# Patient Record
Sex: MALE | Race: BLACK OR AFRICAN AMERICAN | Employment: OTHER | URBAN - METROPOLITAN AREA
[De-identification: names, ages, dates, MRNs, and addresses within clinical notes are randomized per-mention and may not be internally consistent; named-entity substitution may affect disease eponyms.]

---

## 2017-06-15 RX ORDER — POTASSIUM CHLORIDE 750 MG/1
TABLET, EXTENDED RELEASE ORAL
Refills: 1 | OUTPATIENT
Start: 2017-06-15

## 2017-06-15 NOTE — TELEPHONE ENCOUNTER
Called Cande Curiel pts dgt. Told her per Dr. Refugio Melgar that he has not been seen for more than 9 months and he needs an appointment for labs and f/u. She said he just had his labs drawn at the [facility] [place] and she will fax us a copy. Liseth said he has an appt. On 7/15/17 to see Dr. Refugio Melgar, I ask can she come in sooner so she can get refill, answer no my son just had surgery and Im going to be taking care of him. I ask if [facility] will fill the potassium based on they just seen him, she said I will ask them.

## 2017-06-15 NOTE — TELEPHONE ENCOUNTER
Major Wills, dtr requesting refill for:     KLOR-CON M10 10 mEq tablet     Best number to reach her is [phone]

## 2017-06-15 NOTE — TELEPHONE ENCOUNTER
juli Cardenas refuse. She's 89yoa, i've not seen for >9months. Please call her for appointment and lab monitoring. Please make sure you give her 30 mins appointment.      Tanesha Rogers MD  6/15/2017

## 2018-06-06 ENCOUNTER — APPOINTMENT (OUTPATIENT)
Dept: CT IMAGING | Age: 83
DRG: 871 | End: 2018-06-06
Attending: EMERGENCY MEDICINE
Payer: MEDICARE

## 2018-06-06 ENCOUNTER — HOSPITAL ENCOUNTER (INPATIENT)
Age: 83
LOS: 4 days | Discharge: HOME HEALTH | DRG: 871 | End: 2018-06-11
Attending: EMERGENCY MEDICINE | Admitting: INTERNAL MEDICINE
Payer: MEDICARE

## 2018-06-06 DIAGNOSIS — I95.9 HYPOTENSION, UNSPECIFIED HYPOTENSION TYPE: ICD-10-CM

## 2018-06-06 DIAGNOSIS — E86.0 DEHYDRATION: Primary | ICD-10-CM

## 2018-06-06 LAB
ANION GAP SERPL CALC-SCNC: 10 MMOL/L (ref 5–15)
APPEARANCE UR: CLEAR
BACTERIA URNS QL MICRO: NEGATIVE /HPF
BASOPHILS # BLD: 0 K/UL (ref 0–0.1)
BASOPHILS NFR BLD: 0 % (ref 0–1)
BILIRUB UR QL: NEGATIVE
BUN SERPL-MCNC: 23 MG/DL (ref 6–20)
BUN/CREAT SERPL: 15 (ref 12–20)
CALCIUM SERPL-MCNC: 9.4 MG/DL (ref 8.5–10.1)
CHLORIDE SERPL-SCNC: 102 MMOL/L (ref 97–108)
CK SERPL-CCNC: 55 U/L (ref 39–308)
CO2 SERPL-SCNC: 26 MMOL/L (ref 21–32)
COLOR UR: ABNORMAL
CREAT SERPL-MCNC: 1.54 MG/DL (ref 0.7–1.3)
DIFFERENTIAL METHOD BLD: ABNORMAL
EOSINOPHIL # BLD: 0.2 K/UL (ref 0–0.4)
EOSINOPHIL NFR BLD: 2 % (ref 0–7)
EPITH CASTS URNS QL MICRO: ABNORMAL /LPF
ERYTHROCYTE [DISTWIDTH] IN BLOOD BY AUTOMATED COUNT: 12.6 % (ref 11.5–14.5)
GLUCOSE BLD STRIP.AUTO-MCNC: 142 MG/DL (ref 65–100)
GLUCOSE SERPL-MCNC: 141 MG/DL (ref 65–100)
GLUCOSE UR STRIP.AUTO-MCNC: NEGATIVE MG/DL
HCT VFR BLD AUTO: 36 % (ref 36.6–50.3)
HGB BLD-MCNC: 12.5 G/DL (ref 12.1–17)
HGB UR QL STRIP: NEGATIVE
HYALINE CASTS URNS QL MICRO: ABNORMAL /LPF (ref 0–5)
IMM GRANULOCYTES # BLD: 0 K/UL (ref 0–0.04)
IMM GRANULOCYTES NFR BLD AUTO: 0 % (ref 0–0.5)
KETONES UR QL STRIP.AUTO: NEGATIVE MG/DL
LACTATE SERPL-SCNC: 1.2 MMOL/L (ref 0.4–2)
LACTATE SERPL-SCNC: 2.1 MMOL/L (ref 0.4–2)
LEUKOCYTE ESTERASE UR QL STRIP.AUTO: NEGATIVE
LYMPHOCYTES # BLD: 1.4 K/UL (ref 0.8–3.5)
LYMPHOCYTES NFR BLD: 14 % (ref 12–49)
MCH RBC QN AUTO: 30.8 PG (ref 26–34)
MCHC RBC AUTO-ENTMCNC: 34.7 G/DL (ref 30–36.5)
MCV RBC AUTO: 88.7 FL (ref 80–99)
MONOCYTES # BLD: 0.5 K/UL (ref 0–1)
MONOCYTES NFR BLD: 5 % (ref 5–13)
NEUTS SEG # BLD: 8.1 K/UL (ref 1.8–8)
NEUTS SEG NFR BLD: 79 % (ref 32–75)
NITRITE UR QL STRIP.AUTO: NEGATIVE
NRBC # BLD: 0 K/UL (ref 0–0.01)
NRBC BLD-RTO: 0 PER 100 WBC
PH UR STRIP: 5 [PH] (ref 5–8)
PLATELET # BLD AUTO: 192 K/UL (ref 150–400)
PMV BLD AUTO: 10.5 FL (ref 8.9–12.9)
POTASSIUM SERPL-SCNC: 3.2 MMOL/L (ref 3.5–5.1)
PROT UR STRIP-MCNC: NEGATIVE MG/DL
RBC # BLD AUTO: 4.06 M/UL (ref 4.1–5.7)
RBC #/AREA URNS HPF: ABNORMAL /HPF (ref 0–5)
SERVICE CMNT-IMP: ABNORMAL
SODIUM SERPL-SCNC: 138 MMOL/L (ref 136–145)
SP GR UR REFRACTOMETRY: >1.03 (ref 1–1.03)
TROPONIN I SERPL-MCNC: <0.04 NG/ML
UA: UC IF INDICATED,UAUC: ABNORMAL
UROBILINOGEN UR QL STRIP.AUTO: 0.2 EU/DL (ref 0.2–1)
WBC # BLD AUTO: 10.3 K/UL (ref 4.1–11.1)
WBC URNS QL MICRO: ABNORMAL /HPF (ref 0–4)

## 2018-06-06 PROCEDURE — 81001 URINALYSIS AUTO W/SCOPE: CPT | Performed by: EMERGENCY MEDICINE

## 2018-06-06 PROCEDURE — 80048 BASIC METABOLIC PNL TOTAL CA: CPT | Performed by: EMERGENCY MEDICINE

## 2018-06-06 PROCEDURE — 93005 ELECTROCARDIOGRAM TRACING: CPT

## 2018-06-06 PROCEDURE — 71275 CT ANGIOGRAPHY CHEST: CPT

## 2018-06-06 PROCEDURE — 36415 COLL VENOUS BLD VENIPUNCTURE: CPT | Performed by: EMERGENCY MEDICINE

## 2018-06-06 PROCEDURE — 82962 GLUCOSE BLOOD TEST: CPT

## 2018-06-06 PROCEDURE — C1751 CATH, INF, PER/CENT/MIDLINE: HCPCS

## 2018-06-06 PROCEDURE — 70450 CT HEAD/BRAIN W/O DYE: CPT

## 2018-06-06 PROCEDURE — 87040 BLOOD CULTURE FOR BACTERIA: CPT | Performed by: EMERGENCY MEDICINE

## 2018-06-06 PROCEDURE — 82550 ASSAY OF CK (CPK): CPT | Performed by: EMERGENCY MEDICINE

## 2018-06-06 PROCEDURE — 74177 CT ABD & PELVIS W/CONTRAST: CPT

## 2018-06-06 PROCEDURE — 96361 HYDRATE IV INFUSION ADD-ON: CPT

## 2018-06-06 PROCEDURE — 84484 ASSAY OF TROPONIN QUANT: CPT | Performed by: EMERGENCY MEDICINE

## 2018-06-06 PROCEDURE — 83605 ASSAY OF LACTIC ACID: CPT | Performed by: EMERGENCY MEDICINE

## 2018-06-06 PROCEDURE — 77030011943

## 2018-06-06 PROCEDURE — 99285 EMERGENCY DEPT VISIT HI MDM: CPT

## 2018-06-06 PROCEDURE — 85025 COMPLETE CBC W/AUTO DIFF WBC: CPT | Performed by: EMERGENCY MEDICINE

## 2018-06-06 PROCEDURE — 74011636320 HC RX REV CODE- 636/320: Performed by: EMERGENCY MEDICINE

## 2018-06-06 PROCEDURE — 74011250636 HC RX REV CODE- 250/636: Performed by: EMERGENCY MEDICINE

## 2018-06-06 RX ORDER — SODIUM CHLORIDE 9 MG/ML
1000 INJECTION, SOLUTION INTRAVENOUS ONCE
Status: COMPLETED | OUTPATIENT
Start: 2018-06-06 | End: 2018-06-06

## 2018-06-06 RX ORDER — SODIUM CHLORIDE 0.9 % (FLUSH) 0.9 %
10 SYRINGE (ML) INJECTION
Status: COMPLETED | OUTPATIENT
Start: 2018-06-06 | End: 2018-06-06

## 2018-06-06 RX ORDER — SODIUM CHLORIDE 9 MG/ML
1000 INJECTION, SOLUTION INTRAVENOUS ONCE
Status: COMPLETED | OUTPATIENT
Start: 2018-06-06 | End: 2018-06-07

## 2018-06-06 RX ORDER — NOREPINEPHRINE BITARTRATE/D5W 8 MG/250ML
2-16 PLASTIC BAG, INJECTION (ML) INTRAVENOUS
Status: DISCONTINUED | OUTPATIENT
Start: 2018-06-06 | End: 2018-06-08

## 2018-06-06 RX ORDER — SODIUM CHLORIDE 9 MG/ML
50 INJECTION, SOLUTION INTRAVENOUS
Status: COMPLETED | OUTPATIENT
Start: 2018-06-06 | End: 2018-06-07

## 2018-06-06 RX ADMIN — SODIUM CHLORIDE 50 ML/HR: 900 INJECTION, SOLUTION INTRAVENOUS at 20:18

## 2018-06-06 RX ADMIN — SODIUM CHLORIDE 1000 ML: 900 INJECTION, SOLUTION INTRAVENOUS at 18:35

## 2018-06-06 RX ADMIN — IOPAMIDOL 100 ML: 755 INJECTION, SOLUTION INTRAVENOUS at 20:18

## 2018-06-06 RX ADMIN — SODIUM CHLORIDE 1000 ML: 900 INJECTION, SOLUTION INTRAVENOUS at 20:44

## 2018-06-06 RX ADMIN — Medication 10 ML: at 20:18

## 2018-06-06 RX ADMIN — SODIUM CHLORIDE 1000 ML: 900 INJECTION, SOLUTION INTRAVENOUS at 18:44

## 2018-06-06 NOTE — ED NOTES
Bedside and Verbal shift change report given to Nayeli Peters RN (oncoming nurse) by Emmanuel Crane RN (offgoing nurse). Report included the following information SBAR, Kardex, ED Summary, MAR, Accordion and Recent Results.

## 2018-06-06 NOTE — ED PROVIDER NOTES
EMERGENCY DEPARTMENT HISTORY AND PHYSICAL EXAM      Date: 6/6/2018  Patient Name: Glen Miller    History of Presenting Illness     Chief Complaint   Patient presents with    Lethargy    Hypotension       History Provided By: Patient's Daughter    HPI: Glen Miller, 80 y.o. male with PMHx significant for HTN, CAD, CHF, and dementia, presents via EMS to the ED for evaluation after daughter came home and found pt slumped over to R side and was less responsive than baseline. Daughter also reports constipation. She notes pt lives at home with her, is minimally verbal and minimally ambulatory with a walker at baseline. She states pt is a patient at the [facility] and gets injections every 6 months for prostate CA. She denies any recent NVD or fever. History of present illness is limited due AMS. PCP: Mauri Hernandez MD    Current Facility-Administered Medications   Medication Dose Route Frequency Provider Last Rate Last Dose    0.9% sodium chloride infusion 1,000 mL  1,000 mL IntraVENous ONCE Nina Hansen, DO         Current Outpatient Prescriptions   Medication Sig Dispense Refill    KLOR-CON M10 10 mEq tablet TAKE 1 TABLET BY MOUTH EVERY DAY  1    RAPAFLO 4 mg capsule   11    miconazole (MICOTIN) 2 % topical cream Apply  to affected area two (2) times a day. 42.5 g 1    cholecalciferol, vitamin D3, (VITAMIN D3) 2,000 unit tab Take 400 Units by mouth.  hydrochlorothiazide (HYDRODIURIL) 25 mg tablet Take 1 Tab by mouth daily. Indications: HYPERTENSION 60 Tab 1    aspirin 81 mg chewable tablet Take 81 mg by mouth daily.          Past History     Past Medical History:  Past Medical History:   Diagnosis Date    CAD (coronary artery disease)     Chronic systolic congestive heart failure ([address] 8/23/2016    Dementia     Elevated PSA 2/1/2016    H/O prostate cancer 2/1/2016    Hypertension     MI (myocardial infarction) ([address]     Pacemaker 8/23/2016    Pneumonia        Past Surgical History:  Past Surgical History:   Procedure Laterality Date    HX CORONARY STENT PLACEMENT         Family History:  History reviewed. No pertinent family history. Social History:  Social History   Substance Use Topics    Smoking status: Never Smoker    Smokeless tobacco: Never Used    Alcohol use No       Allergies:  No Known Allergies      Review of Systems   Review of Systems   Unable to perform ROS: Mental status change     Physical Exam   Physical Exam   Constitutional: He appears well-developed and well-nourished. HENT:   Head: Normocephalic and atraumatic. Dry mucous membranes   Eyes: Conjunctivae are normal. Pupils are equal, round, and reactive to light. Right eye exhibits no discharge. Left eye exhibits no discharge. Neck: Normal range of motion. Neck supple. No tracheal deviation present. Cardiovascular: Normal rate, regular rhythm and normal heart sounds. No murmur heard. Pulmonary/Chest: Effort normal and breath sounds normal. No respiratory distress. He has no wheezes. He has no rales. Abdominal: Soft. Bowel sounds are normal. There is no tenderness. There is no rebound and no guarding. Musculoskeletal: Normal range of motion. He exhibits no edema, tenderness or deformity. Neurological:   Awake. Responding to family. Moving all extremities, leaning to the R side. Skin: Skin is warm and dry. No rash noted. No erythema. Psychiatric: His behavior is normal.   Nursing note and vitals reviewed.     Diagnostic Study Results     Labs -     Recent Results (from the past 12 hour(s))   EKG, 12 LEAD, INITIAL    Collection Time: 06/06/18  5:29 PM   Result Value Ref Range    Ventricular Rate 73 BPM    Atrial Rate 73 BPM    P-R Interval 166 ms    QRS Duration 166 ms    Q-T Interval 498 ms    QTC Calculation (Bezet) 548 ms    Calculated P Axis 28 degrees    Calculated R Axis -67 degrees    Calculated T Axis 103 degrees    Diagnosis       Atrial-sensed ventricular-paced rhythm  Abnormal ECG  When compared with ECG of 25-OCT-2011 11:00,  Electronic ventricular pacemaker has replaced Sinus rhythm     GLUCOSE, POC    Collection Time: 06/06/18  5:38 PM   Result Value Ref Range    Glucose (POC) 142 (H) 65 - 100 mg/dL    Performed by DESTINEE CHAUDHRY(ELANA)    CBC WITH AUTOMATED DIFF    Collection Time: 06/06/18  6:36 PM   Result Value Ref Range    WBC 10.3 4.1 - 11.1 K/uL    RBC 4.06 (L) 4.10 - 5.70 M/uL    HGB 12.5 12.1 - 17.0 g/dL    HCT 36.0 (L) 36.6 - 50.3 %    MCV 88.7 80.0 - 99.0 FL    MCH 30.8 26.0 - 34.0 PG    MCHC 34.7 30.0 - 36.5 g/dL    RDW 12.6 11.5 - 14.5 %    PLATELET 477 595 - 103 K/uL    MPV 10.5 8.9 - 12.9 FL    NRBC 0.0 0  WBC    ABSOLUTE NRBC 0.00 0.00 - 0.01 K/uL    NEUTROPHILS 79 (H) 32 - 75 %    LYMPHOCYTES 14 12 - 49 %    MONOCYTES 5 5 - 13 %    EOSINOPHILS 2 0 - 7 %    BASOPHILS 0 0 - 1 %    IMMATURE GRANULOCYTES 0 0.0 - 0.5 %    ABS. NEUTROPHILS 8.1 (H) 1.8 - 8.0 K/UL    ABS. LYMPHOCYTES 1.4 0.8 - 3.5 K/UL    ABS. MONOCYTES 0.5 0.0 - 1.0 K/UL    ABS. EOSINOPHILS 0.2 0.0 - 0.4 K/UL    ABS. BASOPHILS 0.0 0.0 - 0.1 K/UL    ABS. IMM.  GRANS. 0.0 0.00 - 0.04 K/UL    DF AUTOMATED     LACTIC ACID    Collection Time: 06/06/18  6:36 PM   Result Value Ref Range    Lactic acid 2.1 (HH) 0.4 - 2.0 MMOL/L   CK W/ REFLX CKMB    Collection Time: 06/06/18  6:36 PM   Result Value Ref Range    CK 55 39 - 308 U/L   TROPONIN I    Collection Time: 06/06/18  6:36 PM   Result Value Ref Range    Troponin-I, Qt. <0.04 <1.85 ng/mL   METABOLIC PANEL, BASIC    Collection Time: 06/06/18  6:36 PM   Result Value Ref Range    Sodium 138 136 - 145 mmol/L    Potassium 3.2 (L) 3.5 - 5.1 mmol/L    Chloride 102 97 - 108 mmol/L    CO2 26 21 - 32 mmol/L    Anion gap 10 5 - 15 mmol/L    Glucose 141 (H) 65 - 100 mg/dL    BUN 23 (H) 6 - 20 MG/DL    Creatinine 1.54 (H) 0.70 - 1.30 MG/DL    BUN/Creatinine ratio 15 12 - 20      GFR est AA 51 (L) >60 ml/min/1.73m2    GFR est non-AA 42 (L) >60 ml/min/1.73m2    Calcium 9.4 8.5 - 10.1 MG/DL   LACTIC ACID    Collection Time: 06/06/18  7:35 PM   Result Value Ref Range    Lactic acid 1.2 0.4 - 2.0 MMOL/L   URINALYSIS W/ REFLEX CULTURE    Collection Time: 06/06/18 10:00 PM   Result Value Ref Range    Color YELLOW/STRAW      Appearance CLEAR CLEAR      Specific gravity >1.030 (H) 1.003 - 1.030    pH (UA) 5.0 5.0 - 8.0      Protein NEGATIVE  NEG mg/dL    Glucose NEGATIVE  NEG mg/dL    Ketone NEGATIVE  NEG mg/dL    Bilirubin NEGATIVE  NEG      Blood NEGATIVE  NEG      Urobilinogen 0.2 0.2 - 1.0 EU/dL    Nitrites NEGATIVE  NEG      Leukocyte Esterase NEGATIVE  NEG      UA:UC IF INDICATED CULTURE NOT INDICATED BY UA RESULT CNI      WBC 0-4 0 - 4 /hpf    RBC 0-5 0 - 5 /hpf    Epithelial cells FEW FEW /lpf    Bacteria NEGATIVE  NEG /hpf    Hyaline cast 0-2 0 - 5 /lpf       Radiologic Studies -   CT ABD PELV W CONT   Final Result      CTA CHEST W OR W WO CONT   Final Result      CT HEAD WO CONT   Final Result        CT Results  (Last 48 hours)               06/06/18 2018  CTA CHEST W OR W WO CONT Final result    Impression:  IMPRESSION:    1. No Pulmonary Embolus. 2. No Acute Findings. Narrative:  INDICATION: Chest pain, acute, pulmonary embolism (PE) suspected        EXAM: CT Angio Chest:       TECHNIQUE: Unenhanced localizing CT imaging of the pulmonary arteries is   followed by bolus injection of 100 mL Isovue 370 contrast IV, with thin section   axial Chest CT obtained and 3D image post processing performed including coronal   MIPS. CT dose reduction was achieved through use of a standardized protocol   tailored for this examination and automatic exposure control for dose   modulation. FINDINGS:    There is no pulmonary embolism. There is no apparent aortic dissection or   aneurysm. Lungs show no acute finding; there is right lower lobe scarring with air   bronchograms  unchanged since 7/12/2015. There is no pneumothorax. There is no pleural or significant pericardial   effusion.  There is no significant adenopathy. Visualized thyroid and lower neck soft tissues are unremarkable for age. 06/06/18 2018  CT ABD PELV W CONT Final result    Impression:  IMPRESSION: No Acute Disease. Narrative:  INDICATION: Abdominal pain        EXAM: CT Abdomen and CT Pelvis is performed with 100 mL Isovue 370 contrast IV   without complication. CT dose reduction was achieved through use of a   standardized protocol tailored for this examination and automatic exposure   control for dose modulation. FINDINGS:    There are colonic diverticula. There is no inflammation, ascites,   pneumoperitoneum or significant adenopathy. Liver and gallbladder are unremarkable. Bile ducts are not enlarged. Pancreas   shows no mass or inflammation. Spleen is unremarkable. Adrenal glands are normal   in size. Kidneys show no mass or hydronephrosis. Aorta is without aneurysm. The appendix is normal. The bladder is not distended. The distal ureters are not   dilated. There is no apparent pelvic mass. 06/06/18 2018  CT HEAD WO CONT Final result    Impression:  IMPRESSION: No acute intracranial abnormality. Atherosclerosis with   microvascular disease and age-related volume loss. Narrative:  EXAM:  CT HEAD WITHOUT CONTRAST   INDICATION: Confusion/delirium, altered LOC, unexplained. Leaning to left,   possible stroke, worsening mental status. COMPARISON: None. CONTRAST: None. TECHNIQUE: Unenhanced CT of the head was performed using 5 mm images. Brain and   bone windows were generated. Sagittal and coronal reformations were generated. CT dose reduction was achieved through use of a standardized protocol tailored   for this examination and automatic exposure control for dose modulation. FINDINGS:   The ventricles and sulci are enlarged in a generalized fashion consistent with   volume loss.   There are atherosclerotic calcifications with patchy decreased attenuation in the periventricular white matter which is nonspecific but   consistent with small vessel disease. There is no intracranial hemorrhage. There is no extra-axial collection, mass, mass effect or midline shift. The   basilar cisterns are open. No acute infarct is identified. The bone windows   demonstrate no abnormalities. Mucus retention cyst in the left maxillary sinus. The lenses of the eyes are very dense. CXR Results  (Last 48 hours)    None            Medical Decision Making   I am the first provider for this patient. I reviewed the vital signs, available nursing notes, past medical history, past surgical history, family history and social history. Vital Signs-Reviewed the patient's vital signs. Patient Vitals for the past 12 hrs:   Temp Pulse Resp BP SpO2   06/06/18 2245 - 74 17 (!) 71/44 100 %   06/06/18 2241 - 70 20 (!) 89/40 99 %   06/06/18 2234 - 71 18 (!) 74/42 98 %   06/06/18 2230 - 72 17 (!) 73/41 99 %   06/06/18 2229 - 70 17 (!) 71/39 99 %   06/06/18 2201 - 78 21 - 99 %   06/06/18 2200 - 91 20 108/48 -   06/06/18 2133 - 73 18 91/53 98 %   06/06/18 2115 - 75 18 98/45 97 %   06/06/18 2100 - 76 21 98/47 97 %   06/06/18 2033 - 74 20 91/43 97 %   06/06/18 2028 - 76 20 - 98 %   06/06/18 2026 - - - 100/46 -   06/06/18 1945 - 73 20 94/45 97 %   06/06/18 1930 - 71 19 98/49 97 %   06/06/18 1915 - 73 20 100/44 -   06/06/18 1837 - 67 13 (!) 88/49 (!) 59 %   06/06/18 1800 - 74 20 (!) 68/33 100 %   06/06/18 1745 - 77 23 (!) 72/40 100 %   06/06/18 1730 97.8 °F (36.6 °C) 77 15 (!) 73/50 99 %       Pulse Oximetry Analysis - 98% on RA    Cardiac Monitor:   Rate: 73 bpm  Rhythm: atrial-sensed ventricular paced rhythm    EKG interpretation: (Preliminary) 6009  Rhythm: atrial-sensed ventricular paced rhythm; and regular . Rate (approx.): 73;  Axis: normal; P wave: normal; QRS interval: normal ; ST/T wave: no ST elevation, no ST depression; no ischemic changes   ms,  ms, QTc 548 ms. Written by Lexi Amin ED Scribe, as dictated by Yue Rodriguez DO    Records Reviewed: Nursing Notes, Old Medical Records, Previous electrocardiograms, Previous Radiology Studies and Previous Laboratory Studies    Provider Notes (Medical Decision Making):   79 yo M presents with hypotension of uncertain etiology. No signs of acute blood loss. Doubt cardiac shock or obstructive shock. Vasodilatory shock and dehydration possible . Will provide IVF. Will scan chest, ABD/pelvis for signs of intraABD bleed and severe infection. Will obtain CT head given pt leaning to R and worsening mental status. ED Course:   Initial assessment performed. The patients presenting problems have been discussed, and they are in agreement with the care plan formulated and outlined with them. I have encouraged them to ask questions as they arise throughout their visit. 10:18 PM  Notified that they are having a difficult time straight cath-ing pt. Written by Lexi Amin, ED Scribe, as dictated by Yue Rodriguez DO.    10:36 PM  Pt BP has improved. Labs and imaging reassuring. Labs consistent with volume depletion/dehydration. Still awaiting UA. Written by Lexi Amin ED Scribe, as dictated by Yue Rodriguez DO.    CONSULT NOTE:   12:15 AM  Carmen Ruby with [facility] attending   Specialty: Emergency Medicine   Discussed pt's hx, disposition, and available diagnostic and imaging results. Reviewed care plans. Does not feel transferring patient is appropriate. Progress:  12:21 AM  Patient's family requesting full code. SIGN OUT:  11:16 PM  Patient's presentation, labs/imaging and plan of care was reviewed with Khushi Booth MD as part of sign out. They will continue care as part of the plan discussed with the patient. Khushi Booth MD's assistance in completion of this plan is greatly appreciated but it should be noted that I will be the provider of record for this patient.     Critical Care Time:   0    Disposition:  12:21 AM  Patient is being admitted to the hospital by Dr. Chelsy Chacon. The results of their tests and reasons for their admission have been discussed with them and/or available family. They convey agreement and understanding for the need to be admitted and for their admission diagnosis. Consultation has been made with the inpatient physician specialist for hospitalization. PLAN:  1. Current Discharge Medication List        2. Follow-up Information     Follow up With Details Comments Contact Info    Nicole Leslie MD In 1 day  [facility] [address] [place] [address]  [phone]      [facility] EMERGENCY DEPT  If symptoms worsen [address] [facility] [address]  [phone]        Return to ED if worse     Diagnosis     Clinical Impression:   1. Dehydration        Attestations: This note is prepared by Cheyenne Chawla, acting as Scribe for Calista Denton DO. Calista Denton DO: The scribe's documentation has been prepared under my direction and personally reviewed by me in its entirety. I confirm that the note above accurately reflects all work, treatment, procedures, and medical decision making performed by me.

## 2018-06-06 NOTE — ED TRIAGE NOTES
Assumed care of this patient. He is alert, oriented to person only. Patient has hx of dementia. Patient's daughter had the home health nurse check patient's BP at home because patient was more lethargic than normal at home. He was recently changed from HCTZ to lisinopril 10mg this week. Patient does have a slight lean to the right.

## 2018-06-06 NOTE — ED NOTES
Care assumed of patient @ this time & intro with rounding done, with report from __Jud SMALL RN_________________. Patient resting quietly on stretcher without verbal complaints.

## 2018-06-06 NOTE — IP AVS SNAPSHOT
[address] [facility] 
[phone] Patient: Bernard Rosado MRN: [id number] :[id number] A check viktoria indicates which time of day the medication should be taken. My Medications START taking these medications Instructions Each Dose to Equal  
 Morning Noon Evening Bedtime  
 amLODIPine 5 mg tablet Commonly known as:  Roberta Cordial Your last dose was: Your next dose is: Take 1 Tab by mouth daily. 5 mg CHANGE how you take these medications Instructions Each Dose to Equal  
 Morning Noon Evening Bedtime  
 miconazole 2 % topical cream  
Commonly known as:  Deanne Phoenix What changed:   
- when to take this 
- reasons to take this Your last dose was: Your next dose is:    
   
   
 Apply  to affected area two (2) times a day. CONTINUE taking these medications Instructions Each Dose to Equal  
 Morning Noon Evening Bedtime  
 aspirin 81 mg chewable tablet Your last dose was: Your next dose is: Take 81 mg by mouth daily. 81 mg  
    
   
   
   
  
 RAPAFLO 4 mg capsule Generic drug:  silodosin Your last dose was: Your next dose is: Take 4 mg by mouth daily (with breakfast). 4 mg VITAMIN D3 2,000 unit Tab Generic drug:  cholecalciferol (vitamin D3) Your last dose was: Your next dose is: Take 400 Units by mouth daily. 400 Units STOP taking these medications   
 lisinopril 10 mg tablet Commonly known as:  Viki Harp Where to Get Your Medications Information on where to get these meds will be given to you by the nurse or doctor. ! Ask your nurse or doctor about these medications  
  amLODIPine 5 mg tablet

## 2018-06-06 NOTE — IP AVS SNAPSHOT
[address] [facility] 
[phone] Patient: Na Carr MRN: [id number] :[id number] About your hospitalization You were admitted on:  2018 You last received care in the:  [facility] 2 GENERAL SURGERY You were discharged on:  2018 Why you were hospitalized Your primary diagnosis was:  Not on File Your diagnoses also included:  Shock (Hcc), Dementia In Alzheimer's Disease, Htn (Hypertension), H/O Prostate Cancer, Acute On Chronic Renal Insufficiency, Hypokalemia Follow-up Information Follow up With Details Comments Contact Info Ann-Marie Dupont MD Go on 2018 For hospital follow up appointment at 12:00PM  [address] [facility] [address] [facility] 
[phone] Valeri العلي MD In 1 week for free voiding trial  [address] [facility] [address] [facility] 
[phone] Ann-Marie Dupont MD   [address] [facility] [address] [facility] 
[phone] [facility] HOME CARE [place] On 2018 THIS IS YOUR HOME HEALTH AGENCY. IF YOU DO NOT HEAR FROM THEM WITHIN 24-48HRS,PLEASE CONTACT THEM DIRECTLY [address] [place] [address] 
1st Floor [place] [address] 
[phone] Your Scheduled Appointments 2018 12:00 PM EDT Office Visit with Ann-Marie Dupont MD  
[facility] [address] [facility] [address] [facility]  
[phone] Discharge Orders None A check viktoria indicates which time of day the medication should be taken. My Medications START taking these medications Instructions Each Dose to Equal  
 Morning Noon Evening Bedtime  
 amLODIPine 5 mg tablet Commonly known as:  Alexander Ruvalcaba Your last dose was: Your next dose is: Take 1 Tab by mouth daily. 5 mg CHANGE how you take these medications Instructions Each Dose to Equal  
 Morning Noon Evening Bedtime  
 miconazole 2 % topical cream  
Commonly known as:  Won Overall What changed:   
- when to take this 
- reasons to take this Your last dose was: Your next dose is:    
   
   
 Apply  to affected area two (2) times a day. CONTINUE taking these medications Instructions Each Dose to Equal  
 Morning Noon Evening Bedtime  
 aspirin 81 mg chewable tablet Your last dose was: Your next dose is: Take 81 mg by mouth daily. 81 mg  
    
   
   
   
  
 RAPAFLO 4 mg capsule Generic drug:  silodosin Your last dose was: Your next dose is: Take 4 mg by mouth daily (with breakfast). 4 mg VITAMIN D3 2,000 unit Tab Generic drug:  cholecalciferol (vitamin D3) Your last dose was: Your next dose is: Take 400 Units by mouth daily. 400 Units STOP taking these medications   
 lisinopril 10 mg tablet Commonly known as:  Vi Guardado Where to Get Your Medications Information on where to get these meds will be given to you by the nurse or doctor. ! Ask your nurse or doctor about these medications  
  amLODIPine 5 mg tablet Discharge Instructions Patient Discharge Instructions Shon Bocanegra / [id number] : 1926 Admitted 2018 Discharged: 2018 DISCHARGE DIAGNOSIS:  
 
Low blood pressure was likely due to dehydration  
- drink plenty of fluids Urinary retention  
-Follow with Dr Tio Gardiner in 1 week for free voiding trial  
 
HTN (hypertension)  
-you were started on new blood pressure medication Norvasc Take Home Medications You were treated with antibiotic while in the hopsital  
 One of the most important side effect to watch while taking antibiotic or after you just finished taking it is diarrhea. This type of diarrhea may be caused by an infection with bacteria called C. difficile. C. difficile normally lives in the intestines. When people are on antibiotics, the C. difficile in their intestines can overgrow and cause infection. If you develop any side effect especially diarrhea while taking antibiotics it is very important to contact your doctor. General drug facts If you have a very bad allergy, wear an allergy ID at all times. It is important that you take the medication exactly as they are prescribed. Keep your medication in the bottles provided by the pharmacist. 
Keep a list of all your drugs (prescription, natural products, vitamins, OTC) with you. Give this list to your doctor. Do not take other medications without consulting your doctor. Do not share your drugs with others and do not take anyone else's drugs. Keep all drugs out of the reach of children and pets. Most drugs may be thrown away in household trash after mixing with coffee grounds or sis litter and sealing in a plastic bag. Keep a list Call your doctor for help with any side effects. If in the [place] you may also call the FDA at 9-270-FDA-2771 Talk with the doctor before starting any new drug, including OTC, natural products, or vitamins. What to do at [facility] 1. Recommended diet: dysphasia 3 - mechanically soft 2. Recommended activity: Activity as tolerated and PT/OT Eval and Treat 3. If you experience any of the following symptoms then please call your primary care physician or return to the emergency room if you cannot get hold of your doctor:fever/chills/dizziness/weakness 4. Lab work: with PCP 5. Bring these papers with you to your follow up appointments.  The papers will help your doctors be sure to continue the care plan from the hospital. 
 
 
 Follow-up with:  
PCP: Shawna Cano MD 
Follow-up Information Follow up With Details Comments Contact Info Shawna Cano MD In 1 week  [address] [facility] [address] [facility] 
[phone] Sudha Mckenna MD In 1 week for free voiding trial  [address] [facility] [address] [facility] 
[phone] Please call for your own appointment Information obtained by : 
I understand that if any problems occur once I am at home I am to contact my physician. I understand and acknowledge receipt of the instructions indicated above. Physician's or R.N.'s Signature                                                                  Date/Time Patient or Representative Signature                                                          Date/Time 
 
 
 
 
[facility] Transitions of Care Introducing [facility] offers a voluntary care coordination program to provide high quality service and care to [facility] fee-for-service beneficiaries. Spencer Palm was designed to help you enhance your health and well-being through the following services: ? Transitions of Care  support for individuals who are transitioning from one care setting to another (example: Hospital to home). ? Chronic and Complex Care Coordination  support for individuals and caregivers of those with serious or chronic illnesses or with more than one chronic (ongoing) condition and those who take a number of different medications.   
 
 
If you meet specific medical criteria, a [address] Coordinator may call you directly to coordinate your care with your primary care physician and your other care providers. For questions about the [facility] programs, please, contact your physicians office. For general questions or additional information about Accountable Care Organizations: 
Please visit www.medicare.gov/acos. html or call 1-800-MEDICARE (0-857.398.5843) TTY users should call 1-646.551.7806. [employer] Announcement We are excited to announce that we are making your provider's discharge notes available to you in [employer]. You will see these notes when they are completed and signed by the physician that discharged you from your recent hospital stay. If you have any questions or concerns about any information you see in [employer], please call the Health Information Department where you were seen or reach out to your Primary Care Provider for more information about your plan of care. Introducing [facility]! Alexa Aquino introduces [employer] patient portal. Now you can access parts of your medical record, email your doctor's office, and request medication refills online. 1. In your internet browser, go to [email or web address] 2. Click on the First Time User? Click Here link in the Sign In box. You will see the New Member Sign Up page. 3. Enter your [employer] Access Code exactly as it appears below. You will not need to use this code after youve completed the sign-up process. If you do not sign up before the expiration date, you must request a new code. · [employer] Access Code: [email or web address] Expires: 9/4/2018  5:29 PM 
 
4. Enter the last four digits of your Social Security Number (xxxx) and Date of Birth (mm/dd/yyyy) as indicated and click Submit. You will be taken to the next sign-up page. 5. Create a [employer] ID. This will be your [employer] login ID and cannot be changed, so think of one that is secure and easy to remember. 6. Create a [employer] password. You can change your password at any time. 7. Enter your Password Reset Question and Answer. This can be used at a later time if you forget your password. 8. Enter your e-mail address. You will receive e-mail notification when new information is available in [address] 9. Click Sign Up. You can now view and download portions of your medical record. 10. Click the Download Summary menu link to download a portable copy of your medical information. If you have questions, please visit the Frequently Asked Questions section of the [employer] website. Remember, [employer] is NOT to be used for urgent needs. For medical emergencies, dial 911. Now available from your iPhone and Android! Introducing Morgan Mclean As a [employer] patient, I wanted to make you aware of our electronic visit tool called Morgan Mclean. [employer] 24/7 allows you to connect within minutes with a medical provider 24 hours a day, seven days a week via a mobile device or tablet or logging into a secure website from your computer. You can access Morgan Mclean from anywhere in the [place]. A virtual visit might be right for you when you have a simple condition and feel like you just dont want to get out of bed, or cant get away from work for an appointment, when your regular [employer] provider is not available (evenings, weekends or holidays), or when youre out of town and need minor care. Electronic visits cost only $49 and if the [employer] 24/7 provider determines a prescription is needed to treat your condition, one can be electronically transmitted to a nearby pharmacy*. Please take a moment to enroll today if you have not already done so. The enrollment process is free and takes just a few minutes. To enroll, please download the [employer] 24/7 celestino to your tablet or phone, or visit [email or web address] to enroll on your computer. And, as an [address] [place] [address] patient with a [employer] account, the results of your visits will be scanned into your electronic medical record and your primary care provider will be able to view the scanned results. We urge you to continue to see your regular [facility] provider for your ongoing medical care. And while your primary care provider may not be the one available when you seek a Morgan Mclean virtual visit, the peace of mind you get from getting a real diagnosis real time can be priceless. For more information on Morgan Haskinsfin, view our Frequently Asked Questions (FAQs) at [email or web address]. Sincerely, 
 
Seble Clinton MD 
Chief Medical Officer Niranjan Ward *:  certain medications cannot be prescribed via Morgan [employer] Unresulted tests-please follow up with your PCP on these results Procedure/Test Authorizing Provider  CBC W/O DIFF Spencer Jean MD  
 CBC W/O DIFF Spencer Jean MD  
 CBC WITH AUTOMATED DIFF [employer], DO  
 CBC WITH AUTOMATED DIFF Marc Chadwick MD  
 CK W/ REFLX CKMB [employer], DO  
 CK W/ REFLX CKMB [employer], DO  
 CORTISOL, AM Marc Chadwick MD  
 CT ABD PELV W CONT [employer], DO  
 CT HEAD WO CONT [employer], DO  
 CTA CHEST W OR W WO CONT [employer], DO  
 CULTURE, BLOOD Spencer Jean MD  
 CULTURE, BLOOD, PAIRED [employer], DO  
 CULTURE, BLOOD, PAIRED [employer], DO  
 CULTURE, URINE Radhames Alex MD  
 EKG, 12 LEAD, INITIAL [employer], DO  
 HEPATIC FUNCTION PANEL Marc Chadwick MD  
 LACTIC ACID [employer], DO  
 LACTIC ACID [employer], DO  
 MAGNESIUM Spencer Jean MD  
 METABOLIC PANEL, BASIC [employer], DO  
 METABOLIC PANEL, BASIC Spencer Jean MD  
 METABOLIC PANEL, BASIC Spencer Jean MD  
 METABOLIC PANEL, COMPREHENSIVE Marc Chadwick MD  
 PHOSPHORUS Spencer Jean MD  
 TROPONIN I [employer], DO  
 TROPONIN I [employer], DO  
 TSH 3RD GENERATION Leigh Montelongo MD  
 URINALYSIS W/ REFLEX CULTURE Mariela Mathieu, DO  
 VANCOMYCIN, TROUGH Jennie Adams MD  
  
Providers Seen During Your Hospitalization Provider Specialty Primary office phone Mariela Murdock DO Emergency Medicine [phone] Jennie Adams MD Internal Medicine [phone] Your Primary Care Physician (PCP) Primary Care Physician Office Phone Office Fax Gladis hCua [phone] You are allergic to the following Allergen Reactions Lisinopril Anaphylaxis Recent Documentation Height Weight BMI Smoking Status 1.626 m 71.3 kg 26.98 kg/m2 Never Smoker Emergency Contacts Name Discharge Info Relation Home Work Mobile [facility] DISCHARGE CAREGIVER [3] Daughter [21] [phone] Manju Curiel  Child [2] [phone] Patient Belongings The following personal items are in your possession at time of discharge: 
     Visual Aid: Glasses, With patient Please provide this summary of care documentation to your next provider. Signatures-by signing, you are acknowledging that this After Visit Summary has been reviewed with you and you have received a copy. Patient Signature:  ____________________________________________________________ Date:  ____________________________________________________________  
  
Claudene Born Provider Signature:  ____________________________________________________________ Date:  ____________________________________________________________

## 2018-06-07 ENCOUNTER — APPOINTMENT (OUTPATIENT)
Dept: GENERAL RADIOLOGY | Age: 83
DRG: 871 | End: 2018-06-07
Attending: EMERGENCY MEDICINE
Payer: MEDICARE

## 2018-06-07 PROBLEM — N18.9 ACUTE ON CHRONIC RENAL INSUFFICIENCY: Status: ACTIVE | Noted: 2018-06-07

## 2018-06-07 PROBLEM — E87.6 HYPOKALEMIA: Status: ACTIVE | Noted: 2018-06-07

## 2018-06-07 PROBLEM — R57.9 SHOCK (HCC): Status: ACTIVE | Noted: 2018-06-07

## 2018-06-07 PROBLEM — N28.9 ACUTE ON CHRONIC RENAL INSUFFICIENCY: Status: ACTIVE | Noted: 2018-06-07

## 2018-06-07 LAB
ALBUMIN SERPL-MCNC: 2.8 G/DL (ref 3.5–5)
ALBUMIN/GLOB SERPL: 0.9 {RATIO} (ref 1.1–2.2)
ALP SERPL-CCNC: 43 U/L (ref 45–117)
ALT SERPL-CCNC: 12 U/L (ref 12–78)
AST SERPL-CCNC: 14 U/L (ref 15–37)
ATRIAL RATE: 73 BPM
BILIRUB DIRECT SERPL-MCNC: 0.1 MG/DL (ref 0–0.2)
BILIRUB SERPL-MCNC: 0.5 MG/DL (ref 0.2–1)
CALCULATED P AXIS, ECG09: 28 DEGREES
CALCULATED R AXIS, ECG10: -67 DEGREES
CALCULATED T AXIS, ECG11: 103 DEGREES
CK SERPL-CCNC: 53 U/L (ref 39–308)
CORTIS AM PEAK SERPL-MCNC: 12.2 UG/DL (ref 4.3–22.4)
DIAGNOSIS, 93000: NORMAL
GLOBULIN SER CALC-MCNC: 3.1 G/DL (ref 2–4)
P-R INTERVAL, ECG05: 166 MS
PROT SERPL-MCNC: 5.9 G/DL (ref 6.4–8.2)
Q-T INTERVAL, ECG07: 498 MS
QRS DURATION, ECG06: 166 MS
QTC CALCULATION (BEZET), ECG08: 548 MS
TROPONIN I SERPL-MCNC: <0.04 NG/ML
TSH SERPL DL<=0.05 MIU/L-ACNC: 2.97 UIU/ML (ref 0.36–3.74)
VENTRICULAR RATE, ECG03: 73 BPM

## 2018-06-07 PROCEDURE — 74011250637 HC RX REV CODE- 250/637: Performed by: INTERNAL MEDICINE

## 2018-06-07 PROCEDURE — 87086 URINE CULTURE/COLONY COUNT: CPT | Performed by: UROLOGY

## 2018-06-07 PROCEDURE — 84484 ASSAY OF TROPONIN QUANT: CPT | Performed by: EMERGENCY MEDICINE

## 2018-06-07 PROCEDURE — 74011250636 HC RX REV CODE- 250/636

## 2018-06-07 PROCEDURE — 51798 US URINE CAPACITY MEASURE: CPT

## 2018-06-07 PROCEDURE — 87040 BLOOD CULTURE FOR BACTERIA: CPT | Performed by: EMERGENCY MEDICINE

## 2018-06-07 PROCEDURE — 84443 ASSAY THYROID STIM HORMONE: CPT | Performed by: INTERNAL MEDICINE

## 2018-06-07 PROCEDURE — 96365 THER/PROPH/DIAG IV INF INIT: CPT

## 2018-06-07 PROCEDURE — 82533 TOTAL CORTISOL: CPT | Performed by: INTERNAL MEDICINE

## 2018-06-07 PROCEDURE — 77030010547 HC BG URIN W/UMETER -A

## 2018-06-07 PROCEDURE — 96366 THER/PROPH/DIAG IV INF ADDON: CPT

## 2018-06-07 PROCEDURE — 77030005518 HC CATH URETH FOL 2W BARD -B

## 2018-06-07 PROCEDURE — 74011000250 HC RX REV CODE- 250: Performed by: EMERGENCY MEDICINE

## 2018-06-07 PROCEDURE — 80076 HEPATIC FUNCTION PANEL: CPT | Performed by: INTERNAL MEDICINE

## 2018-06-07 PROCEDURE — 36415 COLL VENOUS BLD VENIPUNCTURE: CPT | Performed by: EMERGENCY MEDICINE

## 2018-06-07 PROCEDURE — 06HY33Z INSERTION OF INFUSION DEVICE INTO LOWER VEIN, PERCUTANEOUS APPROACH: ICD-10-PCS | Performed by: EMERGENCY MEDICINE

## 2018-06-07 PROCEDURE — 74011250636 HC RX REV CODE- 250/636: Performed by: INTERNAL MEDICINE

## 2018-06-07 PROCEDURE — 74011250636 HC RX REV CODE- 250/636: Performed by: EMERGENCY MEDICINE

## 2018-06-07 PROCEDURE — 82550 ASSAY OF CK (CPK): CPT | Performed by: EMERGENCY MEDICINE

## 2018-06-07 PROCEDURE — 65610000006 HC RM INTENSIVE CARE

## 2018-06-07 RX ORDER — SODIUM CHLORIDE 0.9 % (FLUSH) 0.9 %
5-10 SYRINGE (ML) INJECTION AS NEEDED
Status: DISCONTINUED | OUTPATIENT
Start: 2018-06-07 | End: 2018-06-11 | Stop reason: HOSPADM

## 2018-06-07 RX ORDER — LISINOPRIL 10 MG/1
10 TABLET ORAL DAILY
COMMUNITY
End: 2018-06-11

## 2018-06-07 RX ORDER — ACETAMINOPHEN 325 MG/1
650 TABLET ORAL
Status: DISCONTINUED | OUTPATIENT
Start: 2018-06-07 | End: 2018-06-11 | Stop reason: HOSPADM

## 2018-06-07 RX ORDER — GUAIFENESIN 100 MG/5ML
81 LIQUID (ML) ORAL DAILY
Status: DISCONTINUED | OUTPATIENT
Start: 2018-06-07 | End: 2018-06-11 | Stop reason: HOSPADM

## 2018-06-07 RX ORDER — HALOPERIDOL 5 MG/ML
2 INJECTION INTRAMUSCULAR
Status: COMPLETED | OUTPATIENT
Start: 2018-06-07 | End: 2018-06-07

## 2018-06-07 RX ORDER — HALOPERIDOL 5 MG/ML
INJECTION INTRAMUSCULAR
Status: COMPLETED
Start: 2018-06-07 | End: 2018-06-07

## 2018-06-07 RX ORDER — POTASSIUM CHLORIDE 20 MEQ/1
40 TABLET, EXTENDED RELEASE ORAL
Status: ACTIVE | OUTPATIENT
Start: 2018-06-07 | End: 2018-06-07

## 2018-06-07 RX ORDER — SODIUM CHLORIDE 9 MG/ML
25 INJECTION, SOLUTION INTRAVENOUS CONTINUOUS
Status: DISCONTINUED | OUTPATIENT
Start: 2018-06-07 | End: 2018-06-10

## 2018-06-07 RX ORDER — SILODOSIN 4 MG/1
4 CAPSULE ORAL
Status: DISCONTINUED | OUTPATIENT
Start: 2018-06-07 | End: 2018-06-11 | Stop reason: HOSPADM

## 2018-06-07 RX ORDER — SODIUM CHLORIDE 0.9 % (FLUSH) 0.9 %
5-10 SYRINGE (ML) INJECTION EVERY 8 HOURS
Status: DISCONTINUED | OUTPATIENT
Start: 2018-06-07 | End: 2018-06-11 | Stop reason: HOSPADM

## 2018-06-07 RX ORDER — HEPARIN SODIUM 5000 [USP'U]/ML
5000 INJECTION, SOLUTION INTRAVENOUS; SUBCUTANEOUS EVERY 12 HOURS
Status: DISCONTINUED | OUTPATIENT
Start: 2018-06-07 | End: 2018-06-11 | Stop reason: HOSPADM

## 2018-06-07 RX ORDER — ONDANSETRON 2 MG/ML
4 INJECTION INTRAMUSCULAR; INTRAVENOUS
Status: DISCONTINUED | OUTPATIENT
Start: 2018-06-07 | End: 2018-06-11 | Stop reason: HOSPADM

## 2018-06-07 RX ADMIN — SILODOSIN 4 MG: 4 CAPSULE ORAL at 11:09

## 2018-06-07 RX ADMIN — HALOPERIDOL LACTATE 2 MG: 5 INJECTION, SOLUTION INTRAMUSCULAR at 05:10

## 2018-06-07 RX ADMIN — Medication 10 ML: at 14:45

## 2018-06-07 RX ADMIN — SODIUM CHLORIDE, SODIUM LACTATE, POTASSIUM CHLORIDE, AND CALCIUM CHLORIDE 1000 ML: 600; 310; 30; 20 INJECTION, SOLUTION INTRAVENOUS at 12:05

## 2018-06-07 RX ADMIN — Medication 2 MCG/MIN: at 00:45

## 2018-06-07 RX ADMIN — HALOPERIDOL 2 MG: 5 INJECTION INTRAMUSCULAR at 05:10

## 2018-06-07 RX ADMIN — ASPIRIN 81 MG 81 MG: 81 TABLET ORAL at 11:07

## 2018-06-07 RX ADMIN — SODIUM CHLORIDE 1000 ML: 900 INJECTION, SOLUTION INTRAVENOUS at 00:44

## 2018-06-07 RX ADMIN — SODIUM CHLORIDE 75 ML/HR: 900 INJECTION, SOLUTION INTRAVENOUS at 12:30

## 2018-06-07 RX ADMIN — HEPARIN SODIUM 5000 UNITS: 5000 INJECTION, SOLUTION INTRAVENOUS; SUBCUTANEOUS at 21:55

## 2018-06-07 RX ADMIN — Medication 10 ML: at 21:56

## 2018-06-07 RX ADMIN — HEPARIN SODIUM 5000 UNITS: 5000 INJECTION, SOLUTION INTRAVENOUS; SUBCUTANEOUS at 11:07

## 2018-06-07 RX ADMIN — Medication 10 ML: at 11:09

## 2018-06-07 RX ADMIN — Medication 2 MCG/MIN: at 03:23

## 2018-06-07 NOTE — ED NOTES
Pt becoming increasingly confused, attempting, to remove interventions, attempting to remove IVs. Pt stating\" I don't want to be here, I don't know what is going on, just leave me alone\"

## 2018-06-07 NOTE — ED NOTES
report given to [facility]_____________ and transfer of care.  Patient moved to room #31 per stretcher

## 2018-06-07 NOTE — H&P
Hospitalist Admission Note    NAME: Mak Putnam   :  1926   MRN:  [id number]     Date/Time:  2018 3:12 AM    Patient PCP: Merissa Rashid MD  ______________________________________________________________________  Given the patient's current clinical presentation, I have a high level of concern for decompensation if discharged from the emergency department. Complex decision making was performed, which includes reviewing the patient's available past medical records, laboratory results, and x-ray films. My assessment of this patient's clinical condition and my plan of care is as follows. Assessment / Plan:  Shock   ? Etiology  Suspect Side affect vs Anaphylaxis to Lisinopril as was just started a days ago and HCTZ was stopped upon discussion with daughter  Admit to ICU  Didn't respond to 4L fluid boluses and started on Levophed in ED, will titrate to keep MAP > 65  Stop lisinopril and place under allergy list  IVF  Femoral line placed in ED  Check TSH and am cortisol  No obvious source of infection, f/u blood cultures  UA, CT A/P, CT chest negative in ED  Will check LFTs as not done    Acute encephalopathy with altered level of responsiveness due to hypotension, underlying Dementia in Alzheimer's disease  Due to hypotension  Treatment as above  CT head negative for acute changes    Acute on chronic renal insufficiency with baseline CKD3   due to above  IVF and monitor lytes    Hypokalemia  Replete K and monitor    HTN  Holding meds due to shock as above    H/O prostate cancer  Holding rapaflo due to shock      Code Status: DNR/DNI d/w Daughter    Surrogate Decision Maker: Daughter Cande Curiel    DVT Prophylaxis: SQ Heparin      Subjective:   CHIEF COMPLAINT: altered level of responsiveness     HISTORY OF PRESENT ILLNESS:     Francesca Antunez is a 80 y.o.  male who presents with altered level of responsiveness.  As per daughter who I spoke over the phone, pt was found to be barely responsive at home so his son check his blood pressure and found to be low so brought him to ED. Upon reviewing the medications with the daughter, daughter mentioned pt was taken off HCTZ and started on lisinopril just yesterday. Pt is disoriented due to dementia and unable to provide any meaningful information so history is limited. In ED pt noted to be hypotensive despite 4L of fluid boluses and started on Levophed. We were asked to admit for work up and evaluation of the above problems. Past Medical History:   Diagnosis Date    CAD (coronary artery disease)     Chronic systolic congestive heart failure ([facility] [address] 8/23/2016    Dementia     Elevated PSA 2/1/2016    H/O prostate cancer 2/1/2016    Hypertension     [facility]     Pacemaker 8/23/2016    Pneumonia         Past Surgical History:   Procedure Laterality Date    HX CORONARY STENT PLACEMENT         Social History   Substance Use Topics    Smoking status: Never Smoker    Smokeless tobacco: Never Used    Alcohol use No        Family history: unable to obtain as pt unable to provide due to dementia    Allergies   Allergen Reactions    Lisinopril Anaphylaxis        Prior to Admission medications    Medication Sig Start Date End Date Taking? Authorizing Provider   lisinopril (PRINIVIL, ZESTRIL) 10 mg tablet Take 10 mg by mouth daily. Yes Historical Provider   KLOR-CON M10 10 mEq tablet TAKE 1 TABLET BY MOUTH EVERY DAY 8/9/16   Historical Provider   RAPAFLO 4 mg capsule  11/18/15   Historical Provider   miconazole (MICOTIN) 2 % topical cream Apply  to affected area two (2) times a day. 10/7/15   Irvin Sharma MD   cholecalciferol, vitamin D3, (VITAMIN D3) 2,000 unit tab Take 400 Units by mouth. Historical Provider   hydrochlorothiazide (HYDRODIURIL) 25 mg tablet Take 1 Tab by mouth daily. Indications: HYPERTENSION 9/10/15   Irvin Sharma MD   aspirin 81 mg chewable tablet Take 81 mg by mouth daily. Amy Gorman MD       REVIEW OF SYSTEMS:     I am not able to complete the review of systems because: The patient is intubated and sedated    The patient has altered mental status due to his acute medical problems    The patient has baseline aphasia from prior stroke(s)   y The patient has baseline dementia and is not reliable historian    The patient is in acute medical distress and unable to provide information           Objective:   VITALS:    Visit Vitals    BP (!) 81/44 (BP 1 Location: Right arm, BP Patient Position: During activity)    Pulse 65    Temp 98.1 °F (36.7 °C)    Resp 16    Ht 5' 4\" (1.626 m)    Wt 69.4 kg (153 lb)    SpO2 100%    BMI 26.26 kg/m2       PHYSICAL EXAM:    General:    Alert, cooperative, no distress, appears stated age. HEENT: Atraumatic, anicteric sclerae, pink conjunctivae     No oral ulcers, mucosa moist, throat clear, dentition fair  Neck:  Supple, symmetrical,  thyroid: non tender  Lungs:   Clear to auscultation bilaterally. No Wheezing or Rhonchi. No rales. Chest wall:  No tenderness  No Accessory muscle use. Heart:   Regular  rhythm,  No  murmur   No edema  Abdomen:   Soft, non-tender. Not distended. Bowel sounds normal  Extremities: No cyanosis. No clubbing,      Skin turgor normal, Capillary refill normal, Radial dial pulse 2+  Skin:     Not pale. Not Jaundiced  No rashes   Psych:  Poor insight. Not depressed. Not anxious or agitated. Neurologic: EOMs intact. No facial asymmetry. No aphasia or slurred speech. Symmetrical strength, Sensation grossly intact.  Alert and oriented X 0.     _______________________________________________________________________  Care Plan discussed with:    Comments   Patient     Family  y Daughter over the phone   HARIKA robertson    Care Manager                    Consultant:  marcelo ED physician   _______________________________________________________________________  Expected  Disposition:   Home with Family y   HH/PT/OT/RN    SNF/LTC YOBANI    ________________________________________________________________________  TOTAL TIME: 61 Minutes    Critical Care Provided     Minutes non procedure based      Comments    y Reviewed previous records   >50% of visit spent in counseling and coordination of care y Discussion with family and questions answered       ________________________________________________________________________  Signed: Asa Marshall MD    Procedures: see electronic medical records for all procedures/Xrays and details which were not copied into this note but were reviewed prior to creation of Plan. LAB DATA REVIEWED:    Recent Results (from the past 24 hour(s))   EKG, 12 LEAD, INITIAL    Collection Time: 06/06/18  5:29 PM   Result Value Ref Range    Ventricular Rate 73 BPM    Atrial Rate 73 BPM    P-R Interval 166 ms    QRS Duration 166 ms    Q-T Interval 498 ms    QTC Calculation (Bezet) 548 ms    Calculated P Axis 28 degrees    Calculated R Axis -67 degrees    Calculated T Axis 103 degrees    Diagnosis       Atrial-sensed ventricular-paced rhythm  Abnormal ECG  When compared with ECG of 25-OCT-2011 11:00,  Electronic ventricular pacemaker has replaced Sinus rhythm     GLUCOSE, POC    Collection Time: 06/06/18  5:38 PM   Result Value Ref Range    Glucose (POC) 142 (H) 65 - 100 mg/dL    Performed by DESTINEE CHAUDHRY(ELANA)    CBC WITH AUTOMATED DIFF    Collection Time: 06/06/18  6:36 PM   Result Value Ref Range    WBC 10.3 4.1 - 11.1 K/uL    RBC 4.06 (L) 4.10 - 5.70 M/uL    HGB 12.5 12.1 - 17.0 g/dL    HCT 36.0 (L) 36.6 - 50.3 %    MCV 88.7 80.0 - 99.0 FL    MCH 30.8 26.0 - 34.0 PG    MCHC 34.7 30.0 - 36.5 g/dL    RDW 12.6 11.5 - 14.5 %    PLATELET 168 183 - 104 K/uL    MPV 10.5 8.9 - 12.9 FL    NRBC 0.0 0  WBC    ABSOLUTE NRBC 0.00 0.00 - 0.01 K/uL    NEUTROPHILS 79 (H) 32 - 75 %    LYMPHOCYTES 14 12 - 49 %    MONOCYTES 5 5 - 13 %    EOSINOPHILS 2 0 - 7 %    BASOPHILS 0 0 - 1 %    IMMATURE GRANULOCYTES 0 0.0 - 0.5 %    ABS. NEUTROPHILS 8.1 (H) 1.8 - 8.0 K/UL    ABS. LYMPHOCYTES 1.4 0.8 - 3.5 K/UL    ABS. MONOCYTES 0.5 0.0 - 1.0 K/UL    ABS. EOSINOPHILS 0.2 0.0 - 0.4 K/UL    ABS. BASOPHILS 0.0 0.0 - 0.1 K/UL    ABS. IMM.  GRANS. 0.0 0.00 - 0.04 K/UL    DF AUTOMATED     LACTIC ACID    Collection Time: 06/06/18  6:36 PM   Result Value Ref Range    Lactic acid 2.1 (HH) 0.4 - 2.0 MMOL/L   CK W/ REFLX CKMB    Collection Time: 06/06/18  6:36 PM   Result Value Ref Range    CK 55 39 - 308 U/L   TROPONIN I    Collection Time: 06/06/18  6:36 PM   Result Value Ref Range    Troponin-I, Qt. <0.04 <8.53 ng/mL   METABOLIC PANEL, BASIC    Collection Time: 06/06/18  6:36 PM   Result Value Ref Range    Sodium 138 136 - 145 mmol/L    Potassium 3.2 (L) 3.5 - 5.1 mmol/L    Chloride 102 97 - 108 mmol/L    CO2 26 21 - 32 mmol/L    Anion gap 10 5 - 15 mmol/L    Glucose 141 (H) 65 - 100 mg/dL    BUN 23 (H) 6 - 20 MG/DL    Creatinine 1.54 (H) 0.70 - 1.30 MG/DL    BUN/Creatinine ratio 15 12 - 20      GFR est AA 51 (L) >60 ml/min/1.73m2    GFR est non-AA 42 (L) >60 ml/min/1.73m2    Calcium 9.4 8.5 - 10.1 MG/DL   LACTIC ACID    Collection Time: 06/06/18  7:35 PM   Result Value Ref Range    Lactic acid 1.2 0.4 - 2.0 MMOL/L   URINALYSIS W/ REFLEX CULTURE    Collection Time: 06/06/18 10:00 PM   Result Value Ref Range    Color YELLOW/STRAW      Appearance CLEAR CLEAR      Specific gravity >1.030 (H) 1.003 - 1.030    pH (UA) 5.0 5.0 - 8.0      Protein NEGATIVE  NEG mg/dL    Glucose NEGATIVE  NEG mg/dL    Ketone NEGATIVE  NEG mg/dL    Bilirubin NEGATIVE  NEG      Blood NEGATIVE  NEG      Urobilinogen 0.2 0.2 - 1.0 EU/dL    Nitrites NEGATIVE  NEG      Leukocyte Esterase NEGATIVE  NEG      UA:UC IF INDICATED CULTURE NOT INDICATED BY UA RESULT CNI      WBC 0-4 0 - 4 /hpf    RBC 0-5 0 - 5 /hpf    Epithelial cells FEW FEW /lpf    Bacteria NEGATIVE  NEG /hpf    Hyaline cast 0-2 0 - 5 /lpf   TROPONIN I    Collection Time: 06/07/18  1:46 AM   Result Value Ref Range    Troponin-I, Qt. <0.04 <0.05 ng/mL   CK W/ REFLX CKMB    Collection Time: 06/07/18  1:46 AM   Result Value Ref Range    CK 53 39 - 308 U/L

## 2018-06-07 NOTE — ED NOTES
Pt demanding urinal st \"hurry up and give it to me\" pt assisted with urinal, pt refusing bed change

## 2018-06-07 NOTE — ED NOTES
This writer called daughter by request of Dr. Rosa Toscano as a follow up to importance of discharging patient and family receiving patient at home tonight. Per Dr. Rosa Toscano, family refused to pick patient up and refused to accept patient via EMS transport. This writer called daughter and informed her of importance of accepting pt home, due to discharge status. Patient's daughter Onel Gaston, verbalized agreement.

## 2018-06-07 NOTE — PROGRESS NOTES
Spiritual Care Assessment/Progress Note  [facility]      NAME: Felicita Gonzales      MRN: [id number]  AGE: 80 y.o. SEX: male  Confucianism Affiliation: Anabaptist   Language: English     6/7/2018     Total Time (in minutes): 6     Spiritual Assessment begun in [facility] 2 CRITICAL CARE 3 through conversation with:         []Patient        [] Family    [] Friend(s)        Reason for Consult: Initial/Spiritual assessment, critical care     Spiritual beliefs: (Please include comment if needed)     [] Identifies with a farheen tradition:     [] Supported by a farheen community:      [] Claims no spiritual orientation:      [] Seeking spiritual identity:           [] Adheres to an individual form of spirituality:      [] Not able to assess:                     Identified resources for coping:      [] Prayer                               [] Music                  [] Guided Imagery     [] Family/friends                 [] Pet visits     [] Devotional reading                         [] Unknown     [] Other:                                              Interventions offered during this visit: (See comments for more details)    Patient Interventions: Initial visit           Plan of Care:     [] Support spiritual and/or cultural needs    [] Support AMD and/or advance care planning process      [] Support grieving process   [] Coordinate Rites and/or Rituals    [] Coordination with community clergy   [] No spiritual needs identified at this time   [x] Detailed Plan of Care below (See Comments)  [] Make referral to Music Therapy  [] Make referral to Pet Therapy     [] Make referral to Addiction services  [] Make referral to [facility]  [] Make referral to Spiritual Care Partner  [] No future visits requested        [] Follow up visits as needed     Comments: Initial visit with patient. Mr. Dexter Cui was not aware of me or who I was. I left a pastoral care card for patient and his daughter.   I shared a prayer blanket with patient. Pastoral care is available to follow and provide support to family/ patient as needed/desired. Please page [phone] as needed. Visit by: Jose Luis Miller. Colton Olguin.  Diamond Campuzano MA, [facility]    Lead  Profession Development [employer] Advancement

## 2018-06-07 NOTE — CDMP QUERY
Dr. Jeronimo Gentle :  Please clarify if this patient is (was) being treated/managed for:     => hypovolemic shock, in setting of  BP 71/44---89/40-71/39-68/33, BP remains low despite 4 liters NS; Txt: 4 liters NS, Levophed   => Other explanation of clinical findings  => Clinically Undetermined (no explanation for clinical findings)    The medical record reflects the following clinical findings, treatment, and risk factors. Risk Factors:  presents for hypotension  Clinical Indicators:  BP 71/44---89/40-71/39-68/33, BP remains low despite 4 liters NS  Treatment: 4 liters NS, Levophed gtt, bld/urine culture pending    Please clarify and document your clinical opinion in the progress notes and discharge summary including the definitive and/or presumptive diagnosis, (suspected or probable), related to the above clinical findings. Please include clinical findings supporting your diagnosis. Thank Aj Segal  [address]; [facility];[address]

## 2018-06-07 NOTE — PROGRESS NOTES
0800: Nurse down to ED to retrieve pt.   0817: Pt in CCU room and wiped down with CHG wipes. Admission assessment complete. Pt required a sitter over night and continues to need one today. Pt is pulling at right femoral central line. Sitter at bedside. 9118: Per Dr. Nikko Ag, pt was bladder scanned and showed he was retaining 490ml or urine. 0930: Pt was incontinent of urine before metzger catheter was placed. 1015: Attemps made to place a coude catheter with out success. Urology was consulted. 1030: Pt had another episode of incontinence before catheter was placed. 1145: Dr. Lokesh Sargent in to place metzger. Orders received to culture urine specimen Dr. Lokesh Sargent collected from freshly placed catheter. Drained 175ml of clear, yellow urine. 1200: Reassessment complete. No changes at this time. 1202: Levo off. MAP >65.  1400: Pt MAP maintains over 65 with Levo off.  1600: Reassessment complete. No changes at this time. MAP continues to maintain 65 or above. 1900: Bedside shift change report given to Samm RN (oncoming nurse) by Isaias Centeno RN (offgoing nurse). Report included the following information SBAR.

## 2018-06-07 NOTE — CONSULTS
[address]  MR#: [id number]  : 1926  ACCOUNT #: [de-identified]   DATE OF SERVICE: 2018    CHIEF COMPLAINT:  Urinary retention. HISTORY OF PRESENT ILLNESS:  A 41-year-old  male patient of Dr. Isabelle Ivy with a history of external radiation therapy and hormonal blockade for prostate cancer since  thought to be NAYE. He has been on Rapaflo for voiding symptoms and was last seen about 4 months ago. His PSA has been undetectable and he is thought to be NAYE. He is currently hypotensive on pressors, has had fluid boluses with minimal urine output. The nurse did a bladder scan showing 500 mL or greater in the bladder, but could not place a regular or a coude style catheter and asked for help. The remainder of his current situation, past medical history, etc., is on the chart and was reviewed. MEDICATIONS:  Reviewed. ALLERGIES:  LISINOPRIL. Further history is limited because of his dementia. PHYSICAL EXAMINATION:  GENERAL:  He is in no apparent distress, lying in bed, anicteric, breathing easily. SKIN:  Cool to touch. ABDOMEN:  Benign. GENITALIA:  Normal except for phimosis. RECTAL:  Deferred. PROCEDURE NOTE:  I attempted to place a 16-[place] coude style catheter unsuccessfully with obstruction either proximal bulb or prostate. I therefore brought out the [employer] urologist kit and used a filiform to get past the presumed stricture. The followers were then used to dilate what did not feel to be a bad stricture with minimal trauma. Then, the 16 [place] catheter in the kit was placed over the wire and placed into the bladder with clear urine return. The balloon was filled with 10 mL of water and a urine specimen was obtained for culture. The catheter was attached to gravity drainage bag and the procedure completed.     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 RECOMMENDATION:  Antibiotics per primary care team.  Leave the catheter indwelling for at least a week. If he is not discharged by then, please reconsult Dr. Diane Lorenzo for further recommendations. Otherwise, follow up with him upon discharge.       Demetri East MD       EPC / SN  D: 06/07/2018 12:21     T: 06/07/2018 12:42  JOB #: [id number]

## 2018-06-07 NOTE — ED NOTES
Tech asked to sit with patient until MD could be notified. MD aware, ordered 2mg haldol.  Pt refusing all other medication

## 2018-06-07 NOTE — CONSULTS
PULMONARY ASSOCI[facility] INTENSIVIST Consult Service Note  Pulmonary, Critical Care, and Sleep Medicine    Name: Tammi Palomino MRN: [id number]   : 1926 Hospital: [address]   Date: 2018   Hospital Day: 2       Subjective/Interval History:   I have reviewed the notes from other providers and old records readily available and summarized findings below with the flowsheet. Seen earlier today on rounds. Pt is unstable and acutely ill in the CCU. IMPRESSION:   1. Shock likely from hypovolemia, dehydration and possible UTI- did not respond to volume resuscitaion so likely not med related  2. Acute on chronic renal insufficiency with baseline CKD3  3. Acute encephalopathy with altered level of responsiveness due to hypotension,   4. Dementia in Alzheimer's disease  5. H/o HTN  6. Urinary retention  7. H/O prostate cancer   8. Hypokalemia  9. Body mass index is 26.26 kg/(m^2). 10. Additional workup outlined below  11. Multiorgan dysfunction as outlined above: Pt has one or more acute or chronic illnesses with severe exacerbation with progression or side effects of treatment that poses a threat to life or bodily function  12. Pt is unstable, unpredictable needing PCU/ CCU monitoring;   13. Pt is critically ill and at high risk of sudden decline and decompensation with life threatening consequenses and continued end organ dysfunction and failure      RECOMMENDATIONS/PLAN:   1. CCU monitoring  2. IV fluids  3. Pressors prn  4. Urology consult   5. Glucose monitoring and SSI  6. IV vasopressors for circulatory shock refractory to fluids to maintain SBP> 90  7. Transfuse prn to maintain Hgb > 7  8. Labs to follow electrolytes, renal function and and blood counts  9. Bronchial hygiene with respiratory therapy techniques, bronchodilators  10. Pt needs IV fluids with additives and Drug therapy requiring intensive monitoring for toxicity  11.  Prescription drug management with home med reconciliation reviewed  12. DVT, SUP prophylaxis  13. Will be available to assist in medical management while in the CCU pending disposition     My assessment/management discussed with: Consultants, Nursing, Pharmacy, Case Management, PT, OT, Respiratory Therapy, Hospitalist and Family for coordination of care    Patient PCP: Ken Cantu MD      Subjective/Initial History:   I have reviewed the flowsheet and previous days notes. Seen earlier today on rounds. I was asked by Ruth Gonzalez MD to see Asa Monsivais a 80 y.o.  male  in consultation for a chief complaint of hypotension refractory to fluids    The patient is unable to give any meaningful history or review of systems due to patient factors. Excerpts from admission and consult notes reviewed as follows:     \"A 66-year-old [facility] male patient with dementia who presents with altered level of responsiveness. As per daughter who I spoke over the phone, pt was found to be barely responsive at home so his son check his blood pressure and found to be low so brought him to ED. Upon reviewing the medications with the daughter, daughter mentioned pt was taken off HCTZ and started on lisinopril just yesterday. Pt is disoriented due to dementia and unable to provide any meaningful information so history is limited. In ED pt noted to be hypotensive despite 4L of fluid boluses and started on Levophed. \"    He is currently hypotensive on pressors, has had fluid boluses with minimal urine output. The nurse did a bladder scan showing 500 mL or greater in the bladder, but could not place a regular or a coude style catheter. No fever. Initally felt pt had reaction to lisinopril per daughter and his [facility]    PMH:  has a past medical history of CAD (coronary artery disease); Chronic systolic congestive heart failure ([facility] [address] (8/23/2016); Dementia; Elevated PSA (2/1/2016); H/O prostate cancer (2/1/2016);  Hypertension; MI (myocardial infarction) ([facility] [address] Pacemaker (8/23/2016); and Pneumonia. PSH:   has a past surgical history that includes hx coronary stent placement. FHX: family history is not on file. SHX:  reports that he has never smoked. He has never used smokeless tobacco. He reports that he does not drink alcohol or use illicit drugs. ROS:Review of systems not obtained due to patient factors.       Allergies   Allergen Reactions    Lisinopril Anaphylaxis    Medications:      MEDS:   Current Facility-Administered Medications   Medication    aspirin chewable tablet 81 mg    sodium chloride (NS) flush 5-10 mL    sodium chloride (NS) flush 5-10 mL    acetaminophen (TYLENOL) tablet 650 mg    ondansetron (ZOFRAN) injection 4 mg    heparin (porcine) injection 5,000 Units    0.9% sodium chloride infusion    potassium chloride (K-DUR, KLOR-CON) SR tablet 40 mEq    silodosin (RAPAFLO) capsule 4 mg    NOREPINephrine (LEVOPHED) 8 mg in 5% dextrose 250mL infusion        Current Facility-Administered Medications:     aspirin chewable tablet 81 mg, 81 mg, Oral, DAILY, Ra Carreon MD, 81 mg at 06/07/18 1107    sodium chloride (NS) flush 5-10 mL, 5-10 mL, IntraVENous, Q8H, Ra Carreon MD, 10 mL at 06/07/18 1109    sodium chloride (NS) flush 5-10 mL, 5-10 mL, IntraVENous, PRN, Uzair Lazo MD    acetaminophen (TYLENOL) tablet 650 mg, 650 mg, Oral, Q6H PRN, Ra Carreon MD    ondansetron (ZOFRAN) injection 4 mg, 4 mg, IntraVENous, Q4H PRN, Uzair Lazo MD    heparin (porcine) injection 5,000 Units, 5,000 Units, SubCUTAneous, Q12H, Ra Carreon MD, 5,000 Units at 06/07/18 1107    0.9% sodium chloride infusion, 75 mL/hr, IntraVENous, CONTINUOUS, Ra Carreon MD, Last Rate: 75 mL/hr at 06/07/18 1230, 75 mL/hr at 06/07/18 1230    potassium chloride (K-DUR, KLOR-CON) SR tablet 40 mEq, 40 mEq, Oral, NOW, Uzair Lazo MD, Stopped at 06/07/18 0313    silodosin (RAPAFLO) capsule 4 mg, 4 mg, Oral, DAILY WITH BREAKFAST, Rowdy MARTINEZ Eileen Glass MD, 4 mg at 18 1109    NOREPINephrine (LEVOPHED) 8 mg in 5% dextrose 250mL infusion, 2-16 mcg/min, IntraVENous, TITRATE, Guera Ray DO, Stopped at 18 1202      Objective:     Vital Signs: Telemetry:    normal sinus rhythm Intake/Output:   Visit Vitals    /60    Pulse 88    Temp 97.7 °F (36.5 °C)    Resp 26    Ht 5' 4\" (1.626 m)    Wt 69.4 kg (153 lb)    SpO2 97%    BMI 26.26 kg/m2       Temp (24hrs), Av.6 °F (36.4 °C), Min:96.7 °F (35.9 °C), Max:98.1 °F (36.7 °C)        O2 Device: Room air       Body mass index is 26.26 kg/(m^2). Wt Readings from Last 4 Encounters:   18 69.4 kg (153 lb)   16 67.1 kg (148 lb)   16 67.5 kg (148 lb 12.8 oz)   16 78.9 kg (174 lb)        No intake or output data in the 24 hours ending 18 1359    Last shift:         Last 3 shifts:         Hemodynamics:    CO:    CI:    CVP:    SVR:   PAP Systolic:    PAP Diastolic:    PVR:    VJ43:        Ventilator Settings:      Mode Rate TV Press PEEP FiO2 PIP Min. Vent                            Physical Exam:     General:  male; appears dehydrated and moderately ill;    HEAD: Normocephalic, without obvious abnormality, atraumatic   EYES: conjunctivae clear. PERRL,  AN Icteric sclerae   NOSE: nares normal, no drainage, no nasal flaring,    THROAT: mucous membranes dry; Lips, mucosa dry; No Thrush; class 3 airway;  tongue midline   Neck: Supple, symmetrical, trachea midline,  No accessory mm use; No Stridor/ cuff leak, No goiter or thyroid tenderness   LYMPH: No abnormally enlarged lymph nodes. in neck or groin   Chest: normal   Lungs: decreased air exchange bibasilar   Heart: Regular rate and rhythm; NO edema   Abdomen: soft, non-tender, without masses or organomegaly   : normal;  No [place];     Extremity: negative, clubbing; no joint swelling or erythema   Neuro: alert;  verbal; speech fluent; withdraws to pain; unable to check gait and station; does follow simple commands   Psych: confused, disoriented ; Unable to assess; No agitation; restless   Skin: Pallor and Warm;    Pulses:Bilateral, Radial, 1+   Capillary refill: normal; well perfused,      Data:         Lab results reviewed. For significant abnormal values and values requiring intervention, see assessment and plan. Labs:    Recent Labs      06/06/18   1836   WBC  10.3   HGB  12.5   PLT  192     Recent Labs      06/07/18   0146  06/06/18   1935  06/06/18   1836   NA   --    --   138   K   --    --   3.2*   CL   --    --   102   CO2   --    --   26   GLU   --    --   141*   BUN   --    --   23*   CREA   --    --   1.54*   CA   --    --   9.4   LAC   --   1.2  2.1*   ALB  2.8*   --    --    SGOT  14*   --    --    ALT  12   --    --      No results for input(s): PH, PCO2, PO2, HCO3, FIO2 in the last 72 hours. Recent Labs      06/07/18   0146  06/06/18   1836   TROIQ  <0.04  <0.04     Lab Results   Component Value Date/Time    BNP 62 10/26/2009 10:30 AM      Lab Results   Component Value Date/Time    Culture result: NO GROWTH AFTER 4 HOURS 06/07/2018 01:46 AM    Culture result: NO GROWTH AFTER 12 HOURS 06/06/2018 06:36 PM     Lab Results   Component Value Date/Time     10/25/2011 11:00 AM         Imaging:  I have personally reviewed the patients radiographs and have reviewed the reports:          Results from [place] encounter on 08/04/15   XR CHEST PA LAT   Narrative **Final Report**      ICD Codes / Adm. Diagnosis: 490  786.2 / Bronchitis, not specified as a    Examination:  CR CHEST PA AND LATERAL  - [id number] - Aug  4 2015 12:15PM  Accession No:  [id number]  Reason:  Bronchitis, CT f/u      REPORT:  EXAM:  CR CHEST PA AND LATERAL    INDICATION:  Bronchitis, CT f/u    COMPARISON:  Chest CT and chest radiograph of 7/12/2015     FINDINGS:    PA and lateral radiographs of the chest demonstrate persistent right lower   lobe airspace disease which was soft to be chronic based on prior imaging studies. Allowing for technical differences, there does not appear to be   progression. No pleural fluid is demonstrated. The lungs are otherwise   clear. The cardiomediastinal silhouette is stable. No vascular congestion   is demonstrated. The bones and soft tissues are unremarkable. IMPRESSION:     Stable appearance of chronic right lower lobe airspace disease. Signing/Reading Doctor: Lizzie Gordon ([id number])    Approved: Lizzie Gordon ([id number])  Aug  4 2015 12:46PM                                     Results from Hospital Encounter encounter on 06/06/18   CT HEAD WO CONT   Narrative EXAM:  CT HEAD WITHOUT CONTRAST  INDICATION: Confusion/delirium, altered LOC, unexplained. Leaning to left,  possible stroke, worsening mental status. COMPARISON: None. CONTRAST: None. TECHNIQUE: Unenhanced CT of the head was performed using 5 mm images. Brain and  bone windows were generated. Sagittal and coronal reformations were generated. CT dose reduction was achieved through use of a standardized protocol tailored  for this examination and automatic exposure control for dose modulation. FINDINGS:  The ventricles and sulci are enlarged in a generalized fashion consistent with  volume loss. There are atherosclerotic calcifications with patchy decreased  attenuation in the periventricular white matter which is nonspecific but  consistent with small vessel disease. There is no intracranial hemorrhage. There is no extra-axial collection, mass, mass effect or midline shift. The  basilar cisterns are open. No acute infarct is identified. The bone windows  demonstrate no abnormalities. Mucus retention cyst in the left maxillary sinus. The lenses of the eyes are very dense. Impression IMPRESSION: No acute intracranial abnormality. Atherosclerosis with  microvascular disease and age-related volume loss.           This care involved high complexity decision making which includes independently reviewing the patient's past medical records, current laboratory results, medication profiles that were immediately available to me and actual Xray images at the bedside in order to assess, support vital system function, and to treat this degree of vital organ system failure, and to prevent further life threatening deterioration of the patients condition. I have provided total of 35  minutes of critical care time rendering care exclusive of any procedures. During this entire length of time the patient's condition was unstable, unpredictable and critically ill in the CCU/ ICU. I was immediately available to the patient whose care required several interactions with nursing, multidisciplinary team members leading to multiple interventions with fluid resuscitation and medication adjustments to optimize respiratory support, hemodynamic treatment, medication changes based on repeat labs results, reviews, exams and assessments. The reason for providing this level of medical care was due to a critical illness that impaired one or more vital organ systems, such that there was a high probability of sudden or life threatening deterioration in the patient's condition.        Medical Decision Making Today:  · Reviewed the flowsheet and previous days notes  · Reviewed and summarized records or history from previous days note or discussions with staff, family  · Parenteral controlled substances - Reviewed/ Adjusted / [place] / Started  · High Risk Drug therapy requiring intensive monitoring for toxicity: eg steroids, pressors, antibiotics  · MAR reviewed and pertinent medications noted or modified as needed  · Reviewed and/or ordered Clinical lab tests  · Reviewed and/or ordered Radiology tests  · Reviewed and/or ordered of Medicine tests  · Independently visualized radiologic Images  · I have personally reviewed the patients ECG / Telemetry  ·  evaluated an Abrupt change in neurologic status         Thank you for allowing us to participate in the care of this patient. We will be happy to follow along with you.     Ronald Lake MD

## 2018-06-07 NOTE — PROGRESS NOTES
Hospitalist Progress Note    NAME: Radha Sood   :  1926   MRN:  [id number]       Assessment / Plan:  Shock   ? Etiology  Suspect Side affect vs Anaphylaxis to Lisinopril as was just started a days ago and HCTZ was stopped upon discussion with daughter  Admit to ICU  Didn't respond to 4L fluid boluses and started on Levophed in ED, will titrate to keep MAP > 65  Stop lisinopril and place under allergy list  IVF  Femoral line placed in ED  Check TSH and am cortisol  No obvious source of infection, f/u blood cultures  UA, CT A/P, CT chest negative in ED  Will check LFTs as not done     Acute encephalopathy with altered level of responsiveness due to hypotension, underlying Dementia in Alzheimer's disease  Due to hypotension  Treatment as above  CT head negative for acute changes     Acute on chronic renal insufficiency with baseline CKD3   due to above  IVF and monitor lytes     Hypokalemia  Replete K and monitor     HTN  Holding meds due to shock as above     H/O prostate cancer  Holding rapaflo due to shock        Code Status: DNR/DNI d/w Daughter  Surrogate Decision Maker: Daughter Cande Curiel  DVT Prophylaxis: SQ Heparin    Baseline:   Recommended Disposition: Home w/Family     Subjective:     Chief Complaint / Reason for Physician Visit: following shock / encephalopathy   Off pressors last 2 hr     Discussed with RN events overnight. Review of Systems:  Symptom Y/N Comments  Symptom Y/N Comments   Fever/Chills    Chest Pain     Poor Appetite    Edema     Cough    Abdominal Pain     Sputum    Joint Pain     SOB/AMADOR    Pruritis/Rash     Nausea/vomit    Tolerating PT/OT     Diarrhea    Tolerating Diet     Constipation    Other       Could NOT obtain due to: Dementia      Objective:     VITALS:   Last 24hrs VS reviewed since prior progress note.  Most recent are:  Patient Vitals for the past 24 hrs:   Temp Pulse Resp BP SpO2   18 0530 - 72 17 107/69 -   18 0515 - 64 19 107/48 - 06/07/18 0500 - 77 21 140/54 -   06/07/18 0445 - 86 17 136/79 -   06/07/18 0430 - 79 20 (!) 165/114 91 %   06/07/18 0420 - 81 17 144/72 (!) 82 %   06/07/18 0400 - 67 18 106/53 100 %   06/07/18 0345 - 62 15 116/50 99 %   06/07/18 0330 - 65 14 104/53 98 %   06/07/18 0315 - 65 17 95/45 96 %   06/07/18 0300 - 66 19 110/46 97 %   06/07/18 0246 - 74 23 (!) 149/96 96 %   06/07/18 0230 - 65 15 114/58 97 %   06/07/18 0215 - 64 20 116/51 97 %   06/07/18 0200 - 64 21 112/53 100 %   06/07/18 0145 - 65 24 128/60 99 %   06/06/18 2345 98.1 °F (36.7 °C) 65 16 (!) 81/44 100 %   06/06/18 2334 - 64 16 (!) 79/27 99 %   06/06/18 2315 - 66 20 (!) 77/35 100 %   06/06/18 2305 - 68 17 (!) 82/45 100 %   06/06/18 2302 - 65 19 (!) 87/41 97 %   06/06/18 2245 - 74 17 (!) 71/44 100 %   06/06/18 2241 - 70 20 (!) 89/40 99 %   06/06/18 2234 - 71 18 (!) 74/42 98 %   06/06/18 2230 - 72 17 (!) 73/41 99 %   06/06/18 2229 - 70 17 (!) 71/39 99 %   06/06/18 2201 - 78 21 - 99 %   06/06/18 2200 - 91 20 108/48 -   06/06/18 2133 - 73 18 91/53 98 %   06/06/18 2115 - 75 18 98/45 97 %   06/06/18 2100 - 76 21 98/47 97 %   06/06/18 2033 - 74 20 91/43 97 %   06/06/18 2028 - 76 20 - 98 %   06/06/18 2026 - - - 100/46 -   06/06/18 1945 - 73 20 94/45 97 %   06/06/18 1930 - 71 19 98/49 97 %   06/06/18 1915 - 73 20 100/44 -   06/06/18 1837 - 67 13 (!) 88/49 (!) 59 %   06/06/18 1800 - 74 20 (!) 68/33 100 %   06/06/18 1745 - 77 23 (!) 72/40 100 %   06/06/18 1730 97.8 °F (36.6 °C) 77 15 (!) 73/50 99 %     No intake or output data in the 24 hours ending 06/07/18 0707     PHYSICAL EXAM:  General: WD, WN. Alert, cooperative, no acute distress    EENT:  EOMI. Anicteric sclerae. MMM  Resp:  CTA bilaterally, no wheezing or rales. No accessory muscle use  CV:  Regular  rhythm,  No edema  GI:  Soft, Non distended, Non tender.  +Bowel sounds  Neurologic:  Awake/alert   Psych:   Poor insight. Not anxious nor agitated  Skin:  No rashes.   No jaundice    Reviewed most current lab test results and cultures  YES  Reviewed most current radiology test results   YES  Review and summation of old records today    NO  Reviewed patient's current orders and MAR    YES  PMH/SH reviewed - no change compared to H&P  ________________________________________________________________________  Care Plan discussed with:    Comments   Patient y    Family      RN y    Care Manager     Consultant                        Multidiciplinary team rounds were held today with , nursing, pharmacist and clinical coordinator. Patient's plan of care was discussed; medications were reviewed and discharge planning was addressed. ________________________________________________________________________  Total NON critical care TIME:  25  Minutes    Total CRITICAL CARE TIME Spent:   Minutes non procedure based      Comments   >50% of visit spent in counseling and coordination of care     ________________________________________________________________________  Lev Lockett MD     Procedures: see electronic medical records for all procedures/Xrays and details which were not copied into this note but were reviewed prior to creation of Plan. LABS:  I reviewed today's most current labs and imaging studies.   Pertinent labs include:  Recent Labs      06/06/18   1836   WBC  10.3   HGB  12.5   HCT  36.0*   PLT  192     Recent Labs      06/07/18   0146  06/06/18 1836   NA   --   138   K   --   3.2*   CL   --   102   CO2   --   26   GLU   --   141*   BUN   --   23*   CREA   --   1.54*   CA   --   9.4   ALB  2.8*   --    TBILI  0.5   --    SGOT  14*   --    ALT  12   --        Signed: Lev Lockett MD

## 2018-06-07 NOTE — ED NOTES
Report called to CCU, RN able to ask questions and clarify.  [id number] Monica Vega for patient transport to [facility]

## 2018-06-07 NOTE — ED NOTES
Pt allowing RN to place blanket, pt refusing to bring leg inside railing. Pt replacing leg in railing once assisted into bed.

## 2018-06-08 LAB
ALBUMIN SERPL-MCNC: 3.2 G/DL (ref 3.5–5)
ALBUMIN/GLOB SERPL: 0.9 {RATIO} (ref 1.1–2.2)
ALP SERPL-CCNC: 56 U/L (ref 45–117)
ALT SERPL-CCNC: 14 U/L (ref 12–78)
ANION GAP SERPL CALC-SCNC: 8 MMOL/L (ref 5–15)
AST SERPL-CCNC: 20 U/L (ref 15–37)
BACTERIA SPEC CULT: NORMAL
BASOPHILS # BLD: 0 K/UL (ref 0–0.1)
BASOPHILS NFR BLD: 0 % (ref 0–1)
BILIRUB SERPL-MCNC: 0.8 MG/DL (ref 0.2–1)
BUN SERPL-MCNC: 7 MG/DL (ref 6–20)
BUN/CREAT SERPL: 9 (ref 12–20)
CALCIUM SERPL-MCNC: 8.3 MG/DL (ref 8.5–10.1)
CC UR VC: NORMAL
CHLORIDE SERPL-SCNC: 108 MMOL/L (ref 97–108)
CO2 SERPL-SCNC: 25 MMOL/L (ref 21–32)
CREAT SERPL-MCNC: 0.76 MG/DL (ref 0.7–1.3)
DIFFERENTIAL METHOD BLD: ABNORMAL
EOSINOPHIL # BLD: 0.1 K/UL (ref 0–0.4)
EOSINOPHIL NFR BLD: 0 % (ref 0–7)
ERYTHROCYTE [DISTWIDTH] IN BLOOD BY AUTOMATED COUNT: 12.5 % (ref 11.5–14.5)
GLOBULIN SER CALC-MCNC: 3.7 G/DL (ref 2–4)
GLUCOSE SERPL-MCNC: 121 MG/DL (ref 65–100)
HCT VFR BLD AUTO: 35.2 % (ref 36.6–50.3)
HGB BLD-MCNC: 12 G/DL (ref 12.1–17)
IMM GRANULOCYTES # BLD: 0 K/UL (ref 0–0.04)
IMM GRANULOCYTES NFR BLD AUTO: 0 % (ref 0–0.5)
LYMPHOCYTES # BLD: 1.3 K/UL (ref 0.8–3.5)
LYMPHOCYTES NFR BLD: 9 % (ref 12–49)
MCH RBC QN AUTO: 29.9 PG (ref 26–34)
MCHC RBC AUTO-ENTMCNC: 34.1 G/DL (ref 30–36.5)
MCV RBC AUTO: 87.6 FL (ref 80–99)
MONOCYTES # BLD: 0.8 K/UL (ref 0–1)
MONOCYTES NFR BLD: 6 % (ref 5–13)
NEUTS SEG # BLD: 12.3 K/UL (ref 1.8–8)
NEUTS SEG NFR BLD: 85 % (ref 32–75)
NRBC # BLD: 0 K/UL (ref 0–0.01)
NRBC BLD-RTO: 0 PER 100 WBC
PLATELET # BLD AUTO: 159 K/UL (ref 150–400)
PMV BLD AUTO: 10.3 FL (ref 8.9–12.9)
POTASSIUM SERPL-SCNC: 3 MMOL/L (ref 3.5–5.1)
PROT SERPL-MCNC: 6.9 G/DL (ref 6.4–8.2)
RBC # BLD AUTO: 4.02 M/UL (ref 4.1–5.7)
SERVICE CMNT-IMP: NORMAL
SODIUM SERPL-SCNC: 141 MMOL/L (ref 136–145)
WBC # BLD AUTO: 14.5 K/UL (ref 4.1–11.1)

## 2018-06-08 PROCEDURE — 97161 PT EVAL LOW COMPLEX 20 MIN: CPT

## 2018-06-08 PROCEDURE — 74011250636 HC RX REV CODE- 250/636: Performed by: INTERNAL MEDICINE

## 2018-06-08 PROCEDURE — G8978 MOBILITY CURRENT STATUS: HCPCS

## 2018-06-08 PROCEDURE — 65270000029 HC RM PRIVATE

## 2018-06-08 PROCEDURE — 80053 COMPREHEN METABOLIC PANEL: CPT | Performed by: INTERNAL MEDICINE

## 2018-06-08 PROCEDURE — 36415 COLL VENOUS BLD VENIPUNCTURE: CPT | Performed by: INTERNAL MEDICINE

## 2018-06-08 PROCEDURE — G8987 SELF CARE CURRENT STATUS: HCPCS | Performed by: OCCUPATIONAL THERAPIST

## 2018-06-08 PROCEDURE — 74011250637 HC RX REV CODE- 250/637: Performed by: INTERNAL MEDICINE

## 2018-06-08 PROCEDURE — G8988 SELF CARE GOAL STATUS: HCPCS | Performed by: OCCUPATIONAL THERAPIST

## 2018-06-08 PROCEDURE — 85025 COMPLETE CBC W/AUTO DIFF WBC: CPT | Performed by: INTERNAL MEDICINE

## 2018-06-08 PROCEDURE — 97165 OT EVAL LOW COMPLEX 30 MIN: CPT | Performed by: OCCUPATIONAL THERAPIST

## 2018-06-08 PROCEDURE — 74011250636 HC RX REV CODE- 250/636: Performed by: HOSPITALIST

## 2018-06-08 PROCEDURE — 97116 GAIT TRAINING THERAPY: CPT

## 2018-06-08 PROCEDURE — 74011250637 HC RX REV CODE- 250/637: Performed by: HOSPITALIST

## 2018-06-08 PROCEDURE — 92610 EVALUATE SWALLOWING FUNCTION: CPT

## 2018-06-08 PROCEDURE — G8979 MOBILITY GOAL STATUS: HCPCS

## 2018-06-08 RX ORDER — AMLODIPINE BESYLATE 5 MG/1
5 TABLET ORAL DAILY
Status: DISCONTINUED | OUTPATIENT
Start: 2018-06-09 | End: 2018-06-11 | Stop reason: HOSPADM

## 2018-06-08 RX ORDER — POTASSIUM CHLORIDE 20 MEQ/1
40 TABLET, EXTENDED RELEASE ORAL 2 TIMES DAILY
Status: DISPENSED | OUTPATIENT
Start: 2018-06-08 | End: 2018-06-09

## 2018-06-08 RX ORDER — VANCOMYCIN 1.75 GRAM/500 ML IN 0.9 % SODIUM CHLORIDE INTRAVENOUS
1750 ONCE
Status: COMPLETED | OUTPATIENT
Start: 2018-06-08 | End: 2018-06-08

## 2018-06-08 RX ORDER — VANCOMYCIN HYDROCHLORIDE
1250
Status: DISCONTINUED | OUTPATIENT
Start: 2018-06-09 | End: 2018-06-10

## 2018-06-08 RX ORDER — MUPIROCIN 20 MG/G
OINTMENT TOPICAL 2 TIMES DAILY
Status: DISCONTINUED | OUTPATIENT
Start: 2018-06-08 | End: 2018-06-11 | Stop reason: HOSPADM

## 2018-06-08 RX ORDER — ORANGE OIL
OIL (ML) MISCELLANEOUS ONCE
Status: COMPLETED | OUTPATIENT
Start: 2018-06-08 | End: 2018-06-08

## 2018-06-08 RX ORDER — POTASSIUM CHLORIDE 1.5 G/1.77G
20 POWDER, FOR SOLUTION ORAL 2 TIMES DAILY WITH MEALS
Status: DISCONTINUED | OUTPATIENT
Start: 2018-06-09 | End: 2018-06-11 | Stop reason: HOSPADM

## 2018-06-08 RX ADMIN — SILODOSIN 4 MG: 4 CAPSULE ORAL at 10:12

## 2018-06-08 RX ADMIN — HEPARIN SODIUM 5000 UNITS: 5000 INJECTION, SOLUTION INTRAVENOUS; SUBCUTANEOUS at 10:07

## 2018-06-08 RX ADMIN — Medication 10 ML: at 05:14

## 2018-06-08 RX ADMIN — POTASSIUM CHLORIDE 40 MEQ: 20 TABLET, EXTENDED RELEASE ORAL at 11:20

## 2018-06-08 RX ADMIN — SODIUM CHLORIDE 25 ML/HR: 900 INJECTION, SOLUTION INTRAVENOUS at 21:27

## 2018-06-08 RX ADMIN — Medication 10 ML: at 21:28

## 2018-06-08 RX ADMIN — ASPIRIN 81 MG 81 MG: 81 TABLET ORAL at 10:08

## 2018-06-08 RX ADMIN — Medication: at 11:00

## 2018-06-08 RX ADMIN — SODIUM CHLORIDE 75 ML/HR: 900 INJECTION, SOLUTION INTRAVENOUS at 00:39

## 2018-06-08 RX ADMIN — HEPARIN SODIUM 5000 UNITS: 5000 INJECTION, SOLUTION INTRAVENOUS; SUBCUTANEOUS at 21:31

## 2018-06-08 RX ADMIN — Medication 10 ML: at 13:45

## 2018-06-08 RX ADMIN — MUPIROCIN: 20 OINTMENT TOPICAL at 10:10

## 2018-06-08 RX ADMIN — VANCOMYCIN HYDROCHLORIDE 1750 MG: 10 INJECTION, POWDER, LYOPHILIZED, FOR SOLUTION INTRAVENOUS at 13:45

## 2018-06-08 NOTE — PROGRESS NOTES
Problem: Dysphagia (Adult)  Goal: *Acute Goals and Plan of Care (Insert Text)  6/8/2018  Speech path goals  1. Pt will tolerate dys 3 diet with thins with no overt s/s of aspiration. Speech LAnguage Pathology bedside swallow evaluation  Patient: Ant Olmstead (45 y.o. male)  Date: 6/8/2018  Primary Diagnosis: Shock ([facility] [address]        Precautions:        ASSESSMENT :  Based on the objective data described below, the patient presents with mild oropharyngeal dysphagia. Orally he was slow to masticate solids but his lower partial was not well fitting. He has an  upper denture that was fitting well. His oral prep and posterior propulsion is mildly slow consistent with his general movements. Pharyngeal phase was grossly wnl but he takes two swallows per bolus which could be due to residue. No coughing or change in vocal quality. He seemed safe for a dys 3 diet. With thins  Speech is low volume. He is very confused. Patient will benefit from skilled intervention to address the above impairments. Patients rehabilitation potential is considered to be Good  Factors which may influence rehabilitation potential include:   []            None noted  [x]            Mental ability/status  [x]            Medical condition  [x]            Home/family situation and support systems  [x]            Safety awareness  []            Pain tolerance/management  []            Other:      PLAN :  Recommendations and Planned Interventions:  dys 3/mech soft   Frequency/Duration: Patient will be followed by speech-language pathology 3 times a week to address goals. Discharge Recommendations: None     SUBJECTIVE:   Patient seems very confused and did not state his name when questioned.      OBJECTIVE:     Past Medical History:   Diagnosis Date    CAD (coronary artery disease)     Chronic systolic congestive heart failure ([facility] [address] 8/23/2016    Dementia     Elevated PSA 2/1/2016    H/O prostate cancer 2/1/2016    Hypertension     MI (myocardial infarction) ([facility] [address]     Pacemaker 8/23/2016    Pneumonia      Past Surgical History:   Procedure Laterality Date    HX CORONARY STENT PLACEMENT       Prior Level of Function/Home Situation:      Diet prior to admission:   Current Diet:  NPO   Cognitive and Communication Status:  Neurologic State: Alert  Orientation Level: Oriented to person, Disoriented to place, Disoriented to situation, Disoriented to time  Cognition: Decreased command following  Perception: Appears intact  Perseveration: No perseveration noted     Oral Assessment:  Oral Assessment  Labial: No impairment  Dentition: Upper dentures;Partials (comment)  Lingual: Other (comment)  Velum: Other (comment)  Mandible: No impairment  P.O. Trials:  Patient Position: upright in bed  Vocal quality prior to P.O.: Low volume  Consistency Presented: Thin liquid;Puree; Solid  How Presented: Self-fed/presented;Straw     Bolus Acceptance: No impairment  Bolus Formation/Control: Impaired  Type of Impairment: Delayed;Mastication  Propulsion: Delayed (# of seconds)  Oral Residue: None  Initiation of Swallow: Delayed (# of seconds)  Laryngeal Elevation: Functional  Aspiration Signs/Symptoms: None                Oral Phase Severity: Mild  Pharyngeal Phase Severity : Mild    NOMS:   The NOMS functional outcome measure was used to quantify this patient's level of swallowing impairment. Based on the NOMS, the patient was determined to be at level 5 for swallow function     G Codes: In compliance with CMSs Claims Based Outcome Reporting, the following G-code set was chosen for this patient based the use of the NOMS functional outcome to quantify this patient's level of swallowing impairment. Using the NOMS, the patient was determined to be at level 5 for swallow function which correlates with the CJ= 20-39% level of severity.     Based on the objective assessment provided within this note, the current, goal, and discharge g-codes are as follows:    Swallow Swallowing:   Swallow Current Status CJ= 20-39%   Swallow Goal Status CI= 1-19%      NOMS Swallowing Levels:  Level 1 (CN): NPO  Level 2 (CM): NPO but takes consistency in therapy  Level 3 (CL): Takes less than 50% of nutrition p.o. and continues with nonoral feedings; and/or safe with mod cues; and/or max diet restriction  Level 4 (CK): Safe swallow but needs mod cues; and/or mod diet restriction; and/or still requires some nonoral feeding/supplements  Level 5 (CJ): Safe swallow with min diet restriction; and/or needs min cues  Level 6 (CI): Independent with p.o.; rare cues; usually self cues; may need to avoid some foods or needs extra time  Level 7 ([address] [place] [address]): Independent for all p.o.  GISELLA. (2003). National Outcomes Measurement System (NOMS): Adult Speech-Language Pathology User's Guide. Pain:  Pain Scale 1: Adult Nonverbal Pain Scale        After treatment:   []            Patient left in no apparent distress sitting up in chair  [x]            Patient left in no apparent distress in bed  [x]            Call bell left within reach  [x]            Nursing notified  [x]            Caregiver present  []            Bed alarm activated    COMMUNICATION/EDUCATION:   The patients plan of care including recommendations, planned interventions, and recommended diet changes were discussed with: Registered Nurse. []            Posted safety precautions in patient's room. []            Patient/family have participated as able in goal setting and plan of care. []            Patient/family agree to work toward stated goals and plan of care. []            Patient understands intent and goals of therapy, but is neutral about his/her participation. [x]            Patient is unable to participate in goal setting and plan of care.     Thank you for this referral.  Yuly Domínguez, SLP  Time Calculation: 15 mins

## 2018-06-08 NOTE — PROGRESS NOTES
Problem: Mobility Impaired (Adult and Pediatric)  Goal: *Acute Goals and Plan of Care (Insert Text)  Physical Therapy Goals  Initiated 6/8/2018  1. Patient will move from supine to sit and sit to supine , scoot up and down and roll side to side in bed with minimal assistance/contact guard assist within 7 day(s). 2.  Patient will transfer from bed to chair and chair to bed with minimal assistance/contact guard assist using the least restrictive device within 7 day(s). 3.  Patient will perform sit to stand with minimal assistance/contact guard assist within 7 day(s). 4.  Patient will ambulate with minimal assistance/contact guard assist for 25 feet with the least restrictive device within 7 day(s). physical Therapy EVALUATION  Patient: Shon Bocanegra (21 y.o. male)  Date: 6/8/2018  Primary Diagnosis: Shock ([address]        Precautions: FALL       ASSESSMENT :  Based on the objective data described below, the patient presents with increased agitation on top of baseline dementia, poor command following, poor motor planning, impaired gait mechanics, impaired standing balance, decreased endurance/activity tolerance, and overall impaired functional mobility. Pt received supine in bed, unable to state name, date of birth, or location. Pt required maxAx2 throughout all aspects of mobility including bed mobility, sit<>stand transfers, and ambulation trial with HHAx2. Pt with lack of initiation of mobility, requiring max verbal and tactile cueing throughout. Gait stability fair/poor overall with pt exhibiting strong (resistive) posterior lean with increased trunk flexion in addition to shuffled gait pattern. Decreased step length noted through R LE in comparison to L LE. Vital signs assessed throughout mobility and remained stable. At this time, pt is a significant falls risk and should continue to have x2 person assist with use of rolling walker during all OOB mobility/ambulation.  Pending clarification of pt's PLOF and available family support/assist at home, recommend [facility] vs SNF at discharge. Patient will benefit from skilled intervention to address the above impairments. Patients rehabilitation potential is considered to be Fair  Factors which may influence rehabilitation potential include:   []         None noted  [x]         Mental ability/status  []         Medical condition  []         Home/family situation and support systems  []         Safety awareness  []         Pain tolerance/management  []         Other:      PLAN :  Recommendations and Planned Interventions:  [x]           Bed Mobility Training             []    Neuromuscular Re-Education  [x]           Transfer Training                   []    Orthotic/Prosthetic Training  [x]           Gait Training                         []    Modalities  [x]           Therapeutic Exercises           []    Edema Management/Control  [x]           Therapeutic Activities            [x]    Patient and Family Training/Education  []           Other (comment):    Frequency/Duration: Patient will be followed by physical therapy  3 times a week to address goals. Discharge Recommendations: TBD pending progress, SNF vs HH PT  Further Equipment Recommendations for Discharge: Owns rolling walker     SUBJECTIVE:   Patient stated Now, now, I don't know what's going on here.     OBJECTIVE DATA SUMMARY:   HISTORY:    Past Medical History:   Diagnosis Date    CAD (coronary artery disease)     Chronic systolic congestive heart failure ([facility] [address] 8/23/2016    Dementia     Elevated PSA 2/1/2016    H/O prostate cancer 2/1/2016    Hypertension     MI (myocardial infarction) ([facility] [address]     Pacemaker 8/23/2016    Pneumonia      Past Surgical History:   Procedure Laterality Date    HX CORONARY STENT PLACEMENT       Prior Level of Function/Home Situation: Pt unable to provide social history/PLOF due to baseline dementia however per chart review, pt ambulates with use of rolling walker within the home and lives with his daughter   Personal factors and/or comorbidities impacting plan of care: dementia    Home Situation  Living Alone:  (lives with daughter per chart review)  Current DME Used/Available at Home: [employer] (per medical record)    EXAMINATION/PRESENTATION/DECISION MAKING:   Critical Behavior:  Neurologic State: Alert  Orientation Level: Oriented to person  Cognition: Decreased command following, Impaired decision making, Poor safety awareness  Safety/Judgement: Decreased awareness of environment, Decreased awareness of need for assistance, Decreased awareness of need for safety, Decreased insight into deficits, Lack of insight into deficits  Hearing: Auditory  Auditory Impairment: None  Skin:  Intact  Edema: None noted   Range Of Motion:                          Strength: Tone & Sensation:                                  Coordination:     Vision:   Tracking:  (unable to assist)  Acuity:  (unable to accurately assess, poorly cooperates w therapists)  Functional Mobility:  Bed Mobility:     Supine to Sit: Maximum assistance; Additional time;Assist x2 (pt not initiating, not following commands, stiffness overall)     Scooting: Maximum assistance  Transfers:  Sit to Stand: Maximum assistance; Additional time;Assist x2  Stand to Sit: Maximum assistance; Additional time;Assist x2 (chair brought up to pt)        Bed to Chair: Maximum assistance; Additional time;Assist x2 (pt resistive/stiff overall)              Balance:   Sitting: Impaired  Sitting - Static: Fair (occasional)  Sitting - Dynamic:  (not assessed)  Standing: Impaired  Standing - Static: Constant support;Fair;Poor (resistive to activities)  Standing - Dynamic : Poor  Ambulation/Gait Training:  Distance (ft): 2 Feet (ft)  Assistive Device: Gait belt (HHAx2)  Ambulation - Level of Assistance: Maximum assistance;Assist x2        Gait Abnormalities: Decreased step clearance;Shuffling gait (strong posterior lean ) Base of Support: Narrowed     Speed/Cary: Shuffled  Step Length: Left shortened;Right shortened                      Functional Measure:  Barthel Index:    Bathin  Bladder: 0  Bowels: 5  Groomin (refused)  Dressin  Feedin (modified diet rec)  Mobility: 0  Stairs: 0  Toilet Use: 0  Transfer (Bed to Chair and Back): 5  Total: 15       Barthel and G-code impairment scale:  Percentage of impairment CH  0% CI  1-19% CJ  20-39% CK  40-59% CL  60-79% CM  80-99% CN  100%   Barthel Score 0-100 100 99-80 79-60 59-40 20-39 1-19   0   Barthel Score 0-20 20 17-19 13-16 9-12 5-8 1-4 0      The Barthel ADL Index: Guidelines  1. The index should be used as a record of what a patient does, not as a record of what a patient could do. 2. The main aim is to establish degree of independence from any help, physical or verbal, however minor and for whatever reason. 3. The need for supervision renders the patient not independent. 4. A patient's performance should be established using the best available evidence. Asking the patient, friends/relatives and nurses are the usual sources, but direct observation and common sense are also important. However direct testing is not needed. 5. Usually the patient's performance over the preceding 24-48 hours is important, but occasionally longer periods will be relevant. 6. Middle categories imply that the patient supplies over 50 per cent of the effort. 7. Use of aids to be independent is allowed. Jeff Taylor., Barthel, D.W. (4704). Functional evaluation: the Barthel Index. [address] [facility] (14)2. [place] marcie PUSHPA Welch, Estela Carrion., Alanis Mahan., Jarett, [facility] (). Measuring the change indisability after inpatient rehabilitation; comparison of the responsiveness of the Barthel Index and Functional Gregg Measure. Journal of Neurology, Neurosurgery, and Psychiatry, 66(4), 835-041.   ARELIS Gay, CLEMENCIA Walls, Abena Yadav MVIOLETA. (2004.) Assessment of post-stroke quality of life in cost-effectiveness studies: The usefulness of the Barthel Index and the EuroQoL-5D. Quality of Life Research, 13, 075-23       G codes: In compliance with CMSs Claims Based Outcome Reporting, the following G-code set was chosen for this patient based on their primary functional limitation being treated: The outcome measure chosen to determine the severity of the functional limitation was the Barthel Index with a score of 15/100 which was correlated with the impairment scale. ? Mobility - Walking and Moving Around:     - CURRENT STATUS: CM - 80%-99% impaired, limited or restricted    - GOAL STATUS: CJ - 20%-39% impaired, limited or restricted    - D/C STATUS:  ---------------To be determined---------------      Physical Therapy Evaluation Charge Determination   History Examination Presentation Decision-Making   MEDIUM  Complexity : 1-2 comorbidities / personal factors will impact the outcome/ POC  MEDIUM Complexity : 3 Standardized tests and measures addressing body structure, function, activity limitation and / or participation in recreation  MEDIUM Complexity : Evolving with changing characteristics  MEDIUM Complexity : FOTO score of 26-74      Based on the above components, the patient evaluation is determined to be of the following complexity level: MEDIUM    Pain:  Pain Scale 1: Adult Nonverbal Pain Scale                 Activity Tolerance:   VSS   Please refer to the flowsheet for vital signs taken during this treatment. After treatment:   [x]         Patient left in no apparent distress sitting up in chair  []         Patient left in no apparent distress in bed  [x]         Call bell left within reach  [x]         Nursing notified  [x]         Caregiver present - 1:1 sitter  []         Bed alarm activated    COMMUNICATION/EDUCATION:   The patients plan of care was discussed with: Occupational Therapist and Registered Nurse.   [x]         Fall prevention education was provided and the patient/caregiver indicated understanding. [x]         Patient/family have participated as able in goal setting and plan of care. [x]         Patient/family agree to work toward stated goals and plan of care. []         Patient understands intent and goals of therapy, but is neutral about his/her participation. []         Patient is unable to participate in goal setting and plan of care.     Thank you for this referral.  Redgie Goodell, PT, DPT   Time Calculation: 17 mins

## 2018-06-08 NOTE — INTERDISCIPLINARY ROUNDS
Interdisciplinary team rounds were held 6/8/2018 with the following team members:Care Management, Diabetes Treatment Specialist, Nursing, Nutrition, Pharmacy, Physical Therapy and Physician. Plan of care discussed. See clinical pathway and/or care plan for interventions and desired outcomes.

## 2018-06-08 NOTE — PROGRESS NOTES
Occupational Therapy Goals  Initiated 6/8/2018  1. Patient will perform supine to sit in preparation for seated grooming with moderate assistance  within 7 day(s). 2.  Patient will perform grooming with supervision/set-up within 7 day(s). 3.  Patient will perform seated upper body adls with minimal assistance/contact guard assist within 7 day(s). 4.  Patient will perform toilet transfers with moderate assistance  within 7 day(s). 5.  Patient will perform all aspects of toileting with moderate assistance  within 7 day(s). 6.  Patient will participate in upper extremity therapeutic exercise/activities with minimal assistance/contact guard assist for 5 minutes within 7 day(s). 7.  Patient will follow simple one step commands 100% during functional activities with minimal verbal cues within 7 day(s). Occupational Therapy EVALUATION  Patient: Bernard Rosado (14 y.o. male)  Date: 6/8/2018  Primary Diagnosis: Shock ([facility] [address]        Precautions: fall, standard       ASSESSMENT :  Based on the objective data described below, the patient presents with confusion, decreased orientation, impaired cognition (dementia at baseline), impaired command following and poor insight into deficits; he was easily agitated and resistive to functional mobiltiy and performing adls this date. Pt is unable to report his PLOF but per medical record, pt lives with his daughter and uses a walker for mobility. Pt's family were not available to provide PLOF information nor emotional support during therapy evaluation . Pt at this time is functioning at maximal assistance for self care and required assistance X2 for functional mobility. Hopefully as pt's mental status/confusion improves, pt's cooperation with therapists will improve and he will be less resistant to intervention. He may need SNF rehab at discharge, depending on his progress and family's ability to provide support.   Pt needs 24 hour assistance at discharge and is likely most comfortable at home due to his dementia at  Baseline. .      Patient will benefit from skilled intervention to address the above impairments. Patients rehabilitation potential is considered to be Guarded  Factors which may influence rehabilitation potential include:   []             None noted  [x]             Mental ability/status  [x]             Medical condition  [x]             Home/family situation and support systems  [x]             Safety awareness  []             Pain tolerance/management  []             Other:      PLAN :  Recommendations and Planned Interventions:  [x]               Self Care Training                  [x]        Therapeutic Activities  [x]               Functional Mobility Training    [x]        Cognitive Retraining  [x]               Therapeutic Exercises           [x]        Endurance Activities  [x]               Balance Training                   [x]        Neuromuscular Re-Education  []               Visual/Perceptual Training     [x]   Home Safety Training  [x]               Patient Education                 [x]        Family Training/Education  []               Other (comment):    Frequency/Duration: Patient will be followed by occupational therapy 3 times a week to address goals. Discharge Recommendations: Rehab vs. 24 hour assistance in home environment  Further Equipment Recommendations for Discharge: tbd     SUBJECTIVE:   Patient stated What's up.    (despite education, reassurance and reinforcement, pt was resistive to intervention by therapy)    OBJECTIVE DATA SUMMARY:   HISTORY:   Past Medical History:   Diagnosis Date    CAD (coronary artery disease)     Chronic systolic congestive heart failure ([facility] [address] 8/23/2016    Dementia     Elevated PSA 2/1/2016    H/O prostate cancer 2/1/2016    Hypertension     MI (myocardial infarction) ([facility] [address]     Pacemaker 8/23/2016    Pneumonia      Past Surgical History:   Procedure Laterality Date    HX CORONARY STENT PLACEMENT Prior Level of Function/Environment/Context: Pt unable to report, family not present,  Per chart lives w daughter, uses a RW  Occupations in which the patient is/was successful, what are the barriers preventing that success: medical/mental status  Performance Patterns (routines, roles, habits, and rituals):   Personal Interests and/or values:   Expanded or extensive additional review of patient history:     Home Situation  Living Alone:  (lives with daughter per chart review)  Current DME Used/Available at Home: Robyn Tracy (per medical record)    Hand dominance: unable to report    EXAMINATION OF PERFORMANCE DEFICITS:  Cognitive/Behavioral Status:  Neurologic State: Alert  Orientation Level: Oriented to person  Cognition: Decreased command following; Impaired decision making;Poor safety awareness  Perception:  (unable to accurately assess)  Perseveration:  (confused re: therapy visit, despite educa tion)  Safety/Judgement: Decreased awareness of environment;Decreased awareness of need for assistance;Decreased awareness of need for safety;Decreased insight into deficits; Lack of insight into deficits    Skin: generally intact    Edema: none observed    Hearing: Auditory  Auditory Impairment: None    Vision/Perceptual:    Tracking:  (unable to assist)                      Acuity:  (unable to accurately assess, poorly cooperates w therapists)         Range of Motion:  BUE: stiffness, pt appears to have functional range, but did not demonstrate this date. Strength:  Functionally intact, pt resisting mobility   strength equal                   Coordination:     Fine Motor Skills-Upper: Left Intact; Right Intact    Gross Motor Skills-Upper: Left Intact; Right Intact    Tone & Sensation:  Appears normal  Sensation, not tested                            Balance:  Sitting: Impaired  Sitting - Static: Fair (occasional)  Sitting - Dynamic:  (not assessed)  Standing: Impaired  Standing - Static: Constant support;Fair;Poor (resistive to activities)  Standing - Dynamic : Poor    Functional Mobility and Transfers for ADLs:  Bed Mobility:  Supine to Sit: Maximum assistance; Additional time;Assist x2 (pt not initiating, not following commands, stiffness overall)  Scooting: Maximum assistance    Transfers:  Sit to Stand: Maximum assistance; Additional time;Assist x2  Stand to Sit: Maximum assistance; Additional time;Assist x2 (chair brought up to pt)  Bed to Chair: Maximum assistance; Additional time;Assist x2 (pt resistive/stiff overall)    ADL Assessment:  Feeding:  (not assessed--will need at least set up on modified diet)    Oral Facial Hygiene/Grooming: Maximum assistance (declines combing hair/washing face)    Bathing: Total assistance    Upper Body Dressing: Maximum assistance    Lower Body Dressing: Maximum assistance    Toileting: Total assistance (metzger cat heter)                ADL Intervention and task modifications:   Pt performed bed mobility with resistance and took small steps to chair with resistance--pt declined combing hair and washing face while seated in chair. Cognitive Retraining  Safety/Judgement: Decreased awareness of environment;Decreased awareness of need for assistance;Decreased awareness of need for safety;Decreased insight into deficits; Lack of insight into deficits          Functional Measure:  Barthel Index:    Bathin  Bladder: 0  Bowels: 5  Groomin (refused)  Dressin  Feedin (modified diet rec)  Mobility: 0  Stairs: 0  Toilet Use: 0  Transfer (Bed to Chair and Back): 5  Total: 15       Barthel and G-code impairment scale:  Percentage of impairment CH  0% CI  1-19% CJ  20-39% CK  40-59% CL  60-79% CM  80-99% CN  100%   Barthel Score 0-100 100 99-80 79-60 59-40 20-39 1-19   0   Barthel Score 0-20 20 17-19 13-16 9-12 5-8 1-4 0      The Barthel ADL Index: Guidelines  1.  The index should be used as a record of what a patient does, not as a record of what a patient could do. 2. The main aim is to establish degree of independence from any help, physical or verbal, however minor and for whatever reason. 3. The need for supervision renders the patient not independent. 4. A patient's performance should be established using the best available evidence. Asking the patient, friends/relatives and nurses are the usual sources, but direct observation and common sense are also important. However direct testing is not needed. 5. Usually the patient's performance over the preceding 24-48 hours is important, but occasionally longer periods will be relevant. 6. Middle categories imply that the patient supplies over 50 per cent of the effort. 7. Use of aids to be independent is allowed. Servando Pham., Barthel, DGLADYS. (3353). Functional evaluation: the Barthel Index. [facility] (14)2. PUSHPA Morales, Janice Koch., [facility] [place], [address] [place] [address] (1999). Measuring the change indisability after inpatient rehabilitation; comparison of the responsiveness of the Barthel Index and Functional [place] Measure. Journal of Neurology, Neurosurgery, and Psychiatry, 66(4), 947-284. Velia Davies, N.J.A, Elias Lauren,  CATE.J.M, & Chauncey Parikh, M.A. (2004.) Assessment of post-stroke quality of life in cost-effectiveness studies: The usefulness of the Barthel Index and the EuroQoL-5D. Quality of Life Research, 13, 019-68       G codes: In compliance with CMSs Claims Based Outcome Reporting, the following G-code set was chosen for this patient based on their primary functional limitation being treated: The outcome measure chosen to determine the severity of the functional limitation was the Barthel Index with a score of 15/100 which was correlated with the impairment scale. ?  Self Care:     - CURRENT STATUS: CM - 80%-99% impaired, limited or restricted    - GOAL STATUS: CL - 60%-79% impaired, limited or restricted    - D/C STATUS:  ---------------To be determined---------------     Occupational Therapy Evaluation Charge Determination   History Examination Decision-Making   MEDIUM Complexity : Expanded review of history including physical, cognitive and psychosocial  history  MEDIUM Complexity : 3-5 performance deficits relating to physical, cognitive , or psychosocial skils that result in activity limitations and / or participation restrictions MEDIUM Complexity : Patient may present with comorbidities that affect occupational performnce. Miniml to moderate modification of tasks or assistance (eg, physical or verbal ) with assesment(s) is necessary to enable patient to complete evaluation       Based on the above components, the patient evaluation is determined to be of the following complexity level: MEDIUM  Pain:  Pain Scale 1: Adult Nonverbal Pain Scale   no complaints of pain              Activity Tolerance:   VSS   Please refer to the flowsheet for vital signs taken during this treatment. After treatment:   [x] Patient left in no apparent distress sitting up in chair--pt with mild agitation post txsession; sitter present, nurse informed  [] Patient left in no apparent distress in bed  [x] Call bell left within reach  [x] Nursing notified  [x] Caregiver present- sitter  [] Bed alarm activated    COMMUNICATION/EDUCATION:   The patients plan of care was discussed with: Physical Therapist, Registered Nurse and Certified Nursing Assistant/Patient Care Technician.  [] Home safety education was provided and the patient/caregiver indicated understanding. [] Patient/family have participated as able in goal setting and plan of care. [] Patient/family agree to work toward stated goals and plan of care. [] Patient understands intent and goals of therapy, but is neutral about his/her participation. [x] Patient is unable to participate in goal setting and plan of care.   This patients plan of care is not appropriate for delegation to SUSAN.    Thank you for this referral.  Melo Fuchs, OTR/L     22 minutes

## 2018-06-08 NOTE — PROGRESS NOTES
Hospitalist Progress Note    NAME: Leandro Conner   :  1926   MRN:  [id number]       Assessment / Plan:  Likely septic shock + hypovolemia shock POA   Gram positive bacteremia   -BP stable now ; + leukocytosis ; MS is likely back to baseline   UA, CT A/P, CT chest negative in ED/ TSH & cortisol normal   Doubt that lisinopril was causing shock   - BC 2/ gram + cocci ; follow UC --> repeat BC  Check echo   --starting vanco for + BC   --Femoral line placed in ED, dc      Hypokalemia  -supplement as needed  -monitor      Acute encephalopathy POA  -MS back to baseline; advanced dementia  -avoid oversedation   -CT head negative for acute changes     Hypokalemia  Replete K and monitor     HTN  -BP better  -will start slowly back on BP meds in am   -PTA: was taking of hctz and started on lisinopril      YAMILET POA, resolved   H/O prostate cancer. Holding rapaflo due to shock        Code Status: DNR/DNI   Surrogate Decision Maker: Daughter Cande Curiel  DVT Prophylaxis: SQ Heparin    Baseline:   Recommended Disposition: pending PT for disposition      Subjective:     Chief Complaint / Reason for Physician Visit: following shock / encephalopathy   Awake, agitated and climbing out of bed occasionally, so sitter at bedside      Discussed with RN events overnight. Review of Systems:  Symptom Y/N Comments  Symptom Y/N Comments   Fever/Chills    Chest Pain     Poor Appetite    Edema     Cough    Abdominal Pain     Sputum    Joint Pain     SOB/AMADOR    Pruritis/Rash     Nausea/vomit    Tolerating PT/OT     Diarrhea    Tolerating Diet     Constipation    Other       Could NOT obtain due to: Dementia      Objective:     VITALS:   Last 24hrs VS reviewed since prior progress note.  Most recent are:  Patient Vitals for the past 24 hrs:   Temp Pulse Resp BP SpO2   18 0900 - 78 17 152/63 99 %   18 0800 97.8 °F (36.6 °C) - 14 155/74 100 %   18 0411 - 67 15 - 100 %   18 0001 98 °F (36.7 °C) 75 21 130/54 98 %   06/08/18 0000 - 75 21 - 98 %   06/07/18 2304 - 67 20 (!) 119/97 (!) 89 %   06/07/18 2200 - 89 25 141/66 100 %   06/07/18 2100 - 82 19 166/70 98 %   06/07/18 2000 97.7 °F (36.5 °C) 72 21 129/62 98 %   06/07/18 1900 - 72 22 120/46 -   06/07/18 1830 - 75 23 143/53 97 %   06/07/18 1800 - 84 24 (!) 138/97 99 %   06/07/18 1730 - 79 23 152/58 99 %   06/07/18 1630 - 63 15 122/62 98 %   06/07/18 1600 98.5 °F (36.9 °C) 84 25 123/90 100 %   06/07/18 1530 - 68 19 (!) 143/38 96 %   06/07/18 1500 - 67 17 130/53 97 %   06/07/18 1430 - 82 19 139/61 100 %   06/07/18 1415 - 61 14 119/46 100 %   06/07/18 1400 - 72 16 123/48 99 %   06/07/18 1330 - 60 14 116/45 98 %   06/07/18 1315 - 65 11 111/58 98 %   06/07/18 1300 - 81 30 (!) 141/118 99 %   06/07/18 1245 - 87 22 142/75 93 %   06/07/18 1230 - 65 17 110/53 98 %   06/07/18 1225 - 66 17 116/51 98 %   06/07/18 1220 - 68 20 112/48 99 %   06/07/18 1217 - 66 19 117/48 99 %   06/07/18 1210 - 69 14 131/42 99 %   06/07/18 1200 98 °F (36.7 °C) 78 22 160/54 97 %   06/07/18 1147 - 91 22 159/72 100 %   06/07/18 1100 - 88 26 148/60 97 %       Intake/Output Summary (Last 24 hours) at 06/08/18 1052  Last data filed at 06/08/18 2930   Gross per 24 hour   Intake          2182.33 ml   Output             1255 ml   Net           927.33 ml        PHYSICAL EXAM:  General: WD, WN. Alert, cooperative, no acute distress    EENT:  EOMI. Anicteric sclerae. MMM  Resp:  CTA bilaterally, no wheezing or rales. No accessory muscle use  CV:  Regular  rhythm,  No edema  GI:  Soft, Non distended, Non tender.  +Bowel sounds  Neurologic:  AAO x 1, confused and agitated , sitter at bedside   Psych:   Poor insight. Not anxious nor agitated  Skin:  No rashes.   No jaundice    Reviewed most current lab test results and cultures  YES  Reviewed most current radiology test results   YES  Review and summation of old records today    NO  Reviewed patient's current orders and MAR    YES  PMH/SH reviewed - no change compared to H&P  ________________________________________________________________________  Care Plan discussed with:    Comments   Patient y    Family      RN y    Care Manager     Consultant                        Multidiciplinary team rounds were held today with , nursing, pharmacist and clinical coordinator. Patient's plan of care was discussed; medications were reviewed and discharge planning was addressed. ________________________________________________________________________  Total NON critical care TIME:  25  Minutes    Total CRITICAL CARE TIME Spent:   Minutes non procedure based      Comments   >50% of visit spent in counseling and coordination of care     ________________________________________________________________________  Aleksandra Washington MD     Procedures: see electronic medical records for all procedures/Xrays and details which were not copied into this note but were reviewed prior to creation of Plan. LABS:  I reviewed today's most current labs and imaging studies.   Pertinent labs include:  Recent Labs      06/08/18 0337 06/06/18 1836   WBC  14.5*  10.3   HGB  12.0*  12.5   HCT  35.2*  36.0*   PLT  159  192     Recent Labs      06/08/18 0337 06/07/18   0146  06/06/18 1836   NA  141   --   138   K  3.0*   --   3.2*   CL  108   --   102   CO2  25   --   26   GLU  121*   --   141*   BUN  7   --   23*   CREA  0.76   --   1.54*   CA  8.3*   --   9.4   ALB  3.2*  2.8*   --    TBILI  0.8  0.5   --    SGOT  20  14*   --    ALT  14  12   --        Signed: Aleksandra Washington MD

## 2018-06-08 NOTE — PROGRESS NOTES
Pharmacy Automatic Renal Dosing Protocol - Antimicrobials    Indication for Antimicrobials: bacteremia (GPC in clusters in 2 of 4)     Current Regimen of Each Antimicrobial:  Vancomycin 1750mg IV x 1, then 1250mg every 18hr (Start Date 18; Day # 1)    Previous Antimicrobial Therapy:      Goal Level: VANCOMYCIN TROUGH GOAL RANGE    Vancomycin Trough: 15 - 20 mcg/mL    Date Dose & Interval Measured (mcg/mL) Extrapolated (mcg/mL)                       Significant Cultures:   : paired blood - GPC in clusters in 2 of 4 bottles - pending    Radiology / Imaging results: (X-ray, CT scan or MRI): none    Paralysis, amputations, malnutrition: none    Labs:  Recent Labs      18   0337  18   1836   CREA  0.76  1.54*   BUN  7  23*   WBC  14.5*  10.3     Temp (24hrs), Av °F (36.7 °C), Min:97.7 °F (36.5 °C), Max:98.5 °F (36.9 °C)    Creatinine Clearance (mL/min) or Dialysis: 52mL/min    Impression/Plan:   · Ok to verify vancomycin 1750mg IV x 1 loading dose, then 1250mg every 18hours to yield a trough of ~16mcg/mL based population kinetic parameters. · Pharmacy will continue to follow  · Antimicrobial stop date TBD     Pharmacy will follow daily and adjust medications as appropriate for renal function and/or serum levels. Thank you,  LIBRADO Perales    Recommended duration of therapy  [email or web address]/[place]/[facility]/Pharmacy/Clinical%20Companion/[email or web address]. docx    Renal Dosing  [email or web address]/[place]/[facility]/Pharmacy/Clinical%20Companion/Renal%[email or web address]. pdf

## 2018-06-08 NOTE — PROGRESS NOTES
Patient arrived to 2134 with sitter. Patient easily excitable, impulsive, agitated and confused. Sitter at bedside.

## 2018-06-08 NOTE — PROGRESS NOTES
Report given. Patient and chart visited. No acute changes during night. Has been fidgety but has done fairly well with sitter. Able to redirect often. Has been aggressive a few times but was able to calm down with talk.

## 2018-06-08 NOTE — PROGRESS NOTES
[facility]MONARY [facility] INTENSIVIST Consult Service Note  Pulmonary, Critical Care, and Sleep Medicine    Name: Na Carr MRN: [id number]   : 1926 Hospital: [facility]   Date: 2018   Hospital Day: 3       Subjective/Interval History:   I have reviewed the notes from other providers and old records readily available and summarized findings below with the flowsheet. Seen earlier today on rounds. Pt is unstable and acutely ill in the CCU.     apprecaite urology help.  history of external radiation therapy and hormonal blockade for prostate cancer since  . He has been on Rapaflo for voiding symptoms and was last seen about 4 months ago. His PSA has been undetectable      [facility]PC blood  bottles. Pt pulled out his PIV, still has femoral line    IMPRESSION:   1. Shock likely from hypovolemia, dehydration and possible UTI- did not respond to volume resuscitaion so likely not med related  2. Gram positve septicemia  3. Acute on chronic renal insufficiency with baseline CKD3  4. Acute encephalopathy with altered level of responsiveness due to hypotension,   5. Dementia in Alzheimer's disease  6. H/o HTN  7. 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 H/O prostate cancer   9. Hypokalemia  Body mass index is 26.98 kg/(m^2). 10. Additional workup outlined below  11. RECOMMENDATIONS/PLAN:   1. May transfer to floor  2. IV vancomycin pending final culture results  3. D/c centraline  4. IV fluids  5. Solano for urinary stricture and retention  6. Labs to follow electrolytes, renal function and and blood counts  7. Prescription drug management with home med reconciliation reviewed  8. DVT, SUP prophylaxis  9.  Will sign off and see upon request after pt leaves CCU     My assessment/management discussed with: Consultants, Nursing, Pharmacy, Case Management, PT, OT, Respiratory Therapy, Hospitalist and Family for coordination of care    Patient PCP: Ann-Marie Dupont MD      Subjective/Initial History:   I have reviewed the flowsheet and previous days notes. Seen earlier today on rounds. I was asked by Chelo Bishop MD to see Bernard Rosado a 80 y.o.  male  in consultation for a chief complaint of hypotension refractory to fluids    The patient is unable to give any meaningful history or review of systems due to patient factors. Excerpts from admission and consult notes reviewed as follows:     \"A 58[facility] male patient with dementia who presents with altered level of responsiveness. As per daughter who I spoke over the phone, pt was found to be barely responsive at home so his son check his blood pressure and found to be low so brought him to ED. Upon reviewing the medications with the daughter, daughter mentioned pt was taken off HCTZ and started on lisinopril just yesterday. Pt is disoriented due to dementia and unable to provide any meaningful information so history is limited. In ED pt noted to be hypotensive despite 4L of fluid boluses and started on Levophed. \"    He is currently hypotensive on pressors, has had fluid boluses with minimal urine output. The nurse did a bladder scan showing 500 mL or greater in the bladder, but could not place a regular or a coude style catheter. No fever. Initally felt pt had reaction to lisinopril per daughter and [facility]    PMH:  has a past medical history of CAD (coronary artery disease); Chronic systolic congestive heart failure ([address] (8/23/2016); Dementia; Elevated PSA (2/1/2016); H/O prostate cancer (2/1/2016); Hypertension; MI (myocardial infarction) ([address] Pacemaker (8/23/2016); and Pneumonia. PSH:   has a past surgical history that includes hx coronary stent placement. FHX: family history is not on file. SHX:  reports that he has never smoked. He has never used smokeless tobacco. He reports that he does not drink alcohol or use illicit drugs.       ROS:Review of systems not obtained due to patient factors.       Allergies   Allergen Reactions    Lisinopril Anaphylaxis    Medications:      MEDS:   Current Facility-Administered Medications   Medication    mupirocin (BACTROBAN) 2 % ointment    [START ON 6/9/2018] potassium chloride (KLOR-CON) packet 20 mEq    potassium chloride (K-DUR, KLOR-CON) SR tablet 40 mEq    vancomycin (VANCOCIN) 1750 mg in  ml infusion    aspirin chewable tablet 81 mg    sodium chloride (NS) flush 5-10 mL    sodium chloride (NS) flush 5-10 mL    acetaminophen (TYLENOL) tablet 650 mg    ondansetron (ZOFRAN) injection 4 mg    heparin (porcine) injection 5,000 Units    0.9% sodium chloride infusion    silodosin (RAPAFLO) capsule 4 mg        Current Facility-Administered Medications:     mupirocin (BACTROBAN) 2 % ointment, , Both Nostrils, BID, Callie Garcia MD  [facility]  [START ON 6/9/2018] potassium chloride (KLOR-CON) packet 20 mEq, 20 mEq, Oral, BID WITH MEALS, Tania Garcia MD    potassium chloride (K-DUR, KLOR-CON) SR tablet 40 mEq, 40 mEq, Oral, BID, Tania Garcia MD, 40 mEq at 06/08/18 1120    vancomycin (VANCOCIN) 1750 mg in  ml infusion, 1,750 mg, IntraVENous, ONCE, Callie Garcia MD, Last Rate: 250 mL/hr at 06/08/18 1345, 1,750 mg at 06/08/18 1345    aspirin chewable tablet 81 mg, 81 mg, Oral, DAILY, Ra Carreon MD, 81 mg at 06/08/18 1008    sodium chloride (NS) flush 5-10 mL, 5-10 mL, IntraVENous, Q8H, Ra Carreon MD, 10 mL at 06/08/18 1345    sodium chloride (NS) flush 5-10 mL, 5-10 mL, IntraVENous, PRN, Sera Weinstein MD    acetaminophen (TYLENOL) tablet 650 mg, 650 mg, Oral, Q6H PRN, Sera Weinstein MD    ondansetron (ZOFRAN) injection 4 mg, 4 mg, IntraVENous, Q4H PRN, Sera Weinstein MD    heparin (porcine) injection 5,000 Units, 5,000 Units, SubCUTAneous, Q12H, Ra Carreon MD, 5,000 Units at 06/08/18 1007    0.9% sodium chloride infusion, 25 mL/hr, IntraVENous, CONTINUOUS, Tania Garcia MD, Last Rate: 25 mL/hr at 18 0919, 25 mL/hr at 18 0919    silodosin (RAPAFLO) capsule 4 mg, 4 mg, Oral, DAILY WITH BREAKFAST, Candelaria Hope MD, 4 mg at 18 1012      Objective:     Vital Signs: Telemetry:    normal sinus rhythm Intake/Output:   Visit Vitals    /78    Pulse 86    Temp 98 °F (36.7 °C)    Resp 24    Ht 5' 4\" (1.626 m)    Wt 71.3 kg (157 lb 3 oz)    SpO2 100%    BMI 26.98 kg/m2       Temp (24hrs), Av °F (36.7 °C), Min:97.7 °F (36.5 °C), Max:98.5 °F (36.9 °C)        O2 Device: Room air       Body mass index is 26.98 kg/(m^2). Wt Readings from Last 4 Encounters:   18 71.3 kg (157 lb 3 oz)   16 67.1 kg (148 lb)   16 67.5 kg (148 lb 12.8 oz)   16 78.9 kg (174 lb)          Intake/Output Summary (Last 24 hours) at 18 1352  Last data filed at 18 4868   Gross per 24 hour   Intake          1138.75 ml   Output             1030 ml   Net           108.75 ml       Last shift:         Last 3 shifts:  1901 -  0700  In: 2216.6 [I.V.:2216.6]  Out: 1283 [Urine:1255]       Hemodynamics:    CO:    CI:    CVP:    SVR:   PAP Systolic:    PAP Diastolic:    PVR:    GB94:        Ventilator Settings:      Mode Rate TV Press PEEP FiO2 PIP Min. Vent                            Physical Exam:     General:  male; appears dehydrated and moderately ill;    HEAD: Normocephalic, without obvious abnormality, atraumatic   EYES: conjunctivae clear. PERRL,  AN Icteric sclerae   NOSE: nares normal, no drainage, no nasal flaring,    THROAT: mucous membranes dry; Lips, mucosa dry; No Thrush; class 3 airway;  tongue midline   Neck: Supple, symmetrical, trachea midline,  No accessory mm use; No Stridor/ cuff leak, No goiter or thyroid tenderness   LYMPH: No abnormally enlarged lymph nodes.  in neck or groin   Chest: normal   Lungs: decreased air exchange bibasilar   Heart: Regular rate and rhythm; NO edema   Abdomen: soft, non-tender, without masses or organomegaly   : normal;  No Solano; Extremity: negative, clubbing; no joint swelling or erythema   Neuro: alert;  verbal; speech fluent; withdraws to pain; unable to check gait and station; does follow simple commands   Psych: confused, disoriented ; Unable to assess; No agitation; restless   Skin: Pallor and Warm;    Pulses:Bilateral, Radial, 1+   Capillary refill: normal; well perfused,      Data:         Lab results reviewed. For significant abnormal values and values requiring intervention, see assessment and plan. Labs:    Recent Labs      06/08/18 0337 06/06/18   1836   WBC  14.5*  10.3   HGB  12.0*  12.5   PLT  159  192     Recent Labs      06/08/18   0337  06/07/18   0146  06/06/18   1935  06/06/18   1836   NA  141   --    --   138   K  3.0*   --    --   3.2*   CL  108   --    --   102   CO2  25   --    --   26   GLU  121*   --    --   141*   BUN  7   --    --   23*   CREA  0.76   --    --   1.54*   CA  8.3*   --    --   9.4   LAC   --    --   1.2  2.1*   ALB  3.2*  2.8*   --    --    SGOT  20  14*   --    --    ALT  14  12   --    --      No results for input(s): PH, PCO2, PO2, HCO3, FIO2 in the last 72 hours. Recent Labs      06/07/18   0146  06/06/18   1836   TROIQ  <0.04  <0.04     Lab Results   Component Value Date/Time    BNP 62 10/26/2009 10:30 AM      Lab Results   Component Value Date/Time    Culture result: (A) 06/07/2018 01:46 AM     GRAM POSITIVE COCCI IN CLUSTERS GROWING IN 2 OF 4 BOTTLES DRAWN (SITE = GROIN)    Culture result: REMAINING BOTTLE(S) HAS/HAVE NO GROWTH SO FAR 06/07/2018 01:46 AM    Culture result: NO GROWTH 2 DAYS 06/06/2018 06:36 PM     Lab Results   Component Value Date/Time     10/25/2011 11:00 AM         Imaging:  I have personally reviewed the patients radiographs and have reviewed the reports:          Results from [place] encounter on 08/04/15   XR CHEST PA LAT   Narrative **Final Report**      ICD Codes / Adm. Diagnosis: 490  786.2 / Bronchitis, not specified as a    Examination:  CR CHEST PA AND LATERAL  - [id number] - Aug  4 2015 12:15PM  Accession No:  [id number]  Reason:  Bronchitis, CT f/u      REPORT:  EXAM:  CR CHEST PA AND LATERAL    INDICATION:  Bronchitis, CT f/u    COMPARISON:  Chest CT and chest radiograph of 7/12/2015     FINDINGS:    PA and lateral radiographs of the chest demonstrate persistent right lower   lobe airspace disease which was soft to be chronic based on prior imaging   studies. Allowing for technical differences, there does not appear to be   progression. No pleural fluid is demonstrated. The lungs are otherwise   clear. The cardiomediastinal silhouette is stable. No vascular congestion   is demonstrated. The bones and soft tissues are unremarkable. IMPRESSION:     Stable appearance of chronic right lower lobe airspace disease. Signing/Reading Doctor: Moises Lezama ([id number])    Approved: Moises Lezama ([id number])  Aug  4 2015 12:46PM                                     Results from Hospital Encounter encounter on 06/06/18   CT HEAD WO CONT   Narrative EXAM:  CT HEAD WITHOUT CONTRAST  INDICATION: Confusion/delirium, altered LOC, unexplained. Leaning to left,  possible stroke, worsening mental status. COMPARISON: None. CONTRAST: None. TECHNIQUE: Unenhanced CT of the head was performed using 5 mm images. Brain and  bone windows were generated. Sagittal and coronal reformations were generated. CT dose reduction was achieved through use of a standardized protocol tailored  for this examination and automatic exposure control for dose modulation. FINDINGS:  The ventricles and sulci are enlarged in a generalized fashion consistent with  volume loss. There are atherosclerotic calcifications with patchy decreased  attenuation in the periventricular white matter which is nonspecific but  consistent with small vessel disease. There is no intracranial hemorrhage.    There is no extra-axial collection, mass, mass effect or midline shift. The  basilar cisterns are open. No acute infarct is identified. The bone windows  demonstrate no abnormalities. Mucus retention cyst in the left maxillary sinus. The lenses of the eyes are very dense. Impression IMPRESSION: No acute intracranial abnormality. Atherosclerosis with  microvascular disease and age-related volume loss. This care involved high complexity decision making which includes independently reviewing the patient's past medical records, current laboratory results, medication profiles that were immediately available to me and actual Xray images at the bedside in order to assess, support vital system function, and to treat this degree of vital organ system failure, and to prevent further life threatening deterioration of the patients condition. I have provided total of 35  minutes of critical care time rendering care exclusive of any procedures. During this entire length of time the patient's condition was unstable, unpredictable and critically ill in the CCU/ ICU. I was immediately available to the patient whose care required several interactions with nursing, multidisciplinary team members leading to multiple interventions with fluid resuscitation and medication adjustments to optimize respiratory support, hemodynamic treatment, medication changes based on repeat labs results, reviews, exams and assessments. The reason for providing this level of medical care was due to a critical illness that impaired one or more vital organ systems, such that there was a high probability of sudden or life threatening deterioration in the patient's condition.        Medical Decision Making Today:  · Reviewed the flowsheet and previous days notes  · Reviewed and summarized records or history from previous days note or discussions with staff, family  · Parenteral controlled substances - Reviewed/ Adjusted / Wanda Escobar / Started  · High Risk Drug therapy requiring intensive monitoring for toxicity: eg steroids, pressors, antibiotics  · MAR reviewed and pertinent medications noted or modified as needed  · Reviewed and/or ordered Clinical lab tests  · Reviewed and/or ordered Radiology tests  · Reviewed and/or ordered of Medicine tests  · Independently visualized radiologic Images  · I have personally reviewed the patients ECG / Telemetry         Thank you for allowing us to participate in the care of this patient. We will be happy to follow along with you.     Marcel Verma MD

## 2018-06-09 LAB
ANION GAP SERPL CALC-SCNC: 9 MMOL/L (ref 5–15)
BUN SERPL-MCNC: 5 MG/DL (ref 6–20)
BUN/CREAT SERPL: 7 (ref 12–20)
CALCIUM SERPL-MCNC: 8.6 MG/DL (ref 8.5–10.1)
CHLORIDE SERPL-SCNC: 109 MMOL/L (ref 97–108)
CO2 SERPL-SCNC: 23 MMOL/L (ref 21–32)
CREAT SERPL-MCNC: 0.72 MG/DL (ref 0.7–1.3)
ERYTHROCYTE [DISTWIDTH] IN BLOOD BY AUTOMATED COUNT: 12.5 % (ref 11.5–14.5)
GLUCOSE SERPL-MCNC: 86 MG/DL (ref 65–100)
HCT VFR BLD AUTO: 37.2 % (ref 36.6–50.3)
HGB BLD-MCNC: 12.6 G/DL (ref 12.1–17)
MAGNESIUM SERPL-MCNC: 1.6 MG/DL (ref 1.6–2.4)
MCH RBC QN AUTO: 30.4 PG (ref 26–34)
MCHC RBC AUTO-ENTMCNC: 33.9 G/DL (ref 30–36.5)
MCV RBC AUTO: 89.6 FL (ref 80–99)
NRBC # BLD: 0 K/UL (ref 0–0.01)
NRBC BLD-RTO: 0 PER 100 WBC
PHOSPHATE SERPL-MCNC: 2.2 MG/DL (ref 2.6–4.7)
PLATELET # BLD AUTO: 144 K/UL (ref 150–400)
PMV BLD AUTO: 10.5 FL (ref 8.9–12.9)
POTASSIUM SERPL-SCNC: 3 MMOL/L (ref 3.5–5.1)
RBC # BLD AUTO: 4.15 M/UL (ref 4.1–5.7)
SODIUM SERPL-SCNC: 141 MMOL/L (ref 136–145)
WBC # BLD AUTO: 11.5 K/UL (ref 4.1–11.1)

## 2018-06-09 PROCEDURE — 80048 BASIC METABOLIC PNL TOTAL CA: CPT | Performed by: HOSPITALIST

## 2018-06-09 PROCEDURE — 74011250637 HC RX REV CODE- 250/637: Performed by: INTERNAL MEDICINE

## 2018-06-09 PROCEDURE — 84100 ASSAY OF PHOSPHORUS: CPT | Performed by: HOSPITALIST

## 2018-06-09 PROCEDURE — 74011250637 HC RX REV CODE- 250/637: Performed by: HOSPITALIST

## 2018-06-09 PROCEDURE — 74011250636 HC RX REV CODE- 250/636: Performed by: INTERNAL MEDICINE

## 2018-06-09 PROCEDURE — 83735 ASSAY OF MAGNESIUM: CPT | Performed by: HOSPITALIST

## 2018-06-09 PROCEDURE — 74011250636 HC RX REV CODE- 250/636: Performed by: HOSPITALIST

## 2018-06-09 PROCEDURE — 36415 COLL VENOUS BLD VENIPUNCTURE: CPT | Performed by: HOSPITALIST

## 2018-06-09 PROCEDURE — 65270000029 HC RM PRIVATE

## 2018-06-09 PROCEDURE — 85027 COMPLETE CBC AUTOMATED: CPT | Performed by: HOSPITALIST

## 2018-06-09 RX ORDER — POTASSIUM CHLORIDE 1.5 G/1.77G
40 POWDER, FOR SOLUTION ORAL
Status: COMPLETED | OUTPATIENT
Start: 2018-06-09 | End: 2018-06-09

## 2018-06-09 RX ORDER — SODIUM,POTASSIUM PHOSPHATES 280-250MG
2 POWDER IN PACKET (EA) ORAL ONCE
Status: COMPLETED | OUTPATIENT
Start: 2018-06-09 | End: 2018-06-09

## 2018-06-09 RX ADMIN — POTASSIUM & SODIUM PHOSPHATES POWDER PACK 280-160-250 MG 2 PACKET: 280-160-250 PACK at 15:12

## 2018-06-09 RX ADMIN — AMLODIPINE BESYLATE 5 MG: 5 TABLET ORAL at 09:33

## 2018-06-09 RX ADMIN — ASPIRIN 81 MG 81 MG: 81 TABLET ORAL at 09:33

## 2018-06-09 RX ADMIN — VANCOMYCIN HYDROCHLORIDE 1250 MG: 10 INJECTION, POWDER, LYOPHILIZED, FOR SOLUTION INTRAVENOUS at 09:32

## 2018-06-09 RX ADMIN — Medication 10 ML: at 22:02

## 2018-06-09 RX ADMIN — HEPARIN SODIUM 5000 UNITS: 5000 INJECTION, SOLUTION INTRAVENOUS; SUBCUTANEOUS at 22:01

## 2018-06-09 RX ADMIN — Medication 10 ML: at 15:12

## 2018-06-09 RX ADMIN — POTASSIUM CHLORIDE 20 MEQ: 1.5 POWDER, FOR SOLUTION ORAL at 09:33

## 2018-06-09 RX ADMIN — POTASSIUM CHLORIDE 40 MEQ: 1.5 POWDER, FOR SOLUTION ORAL at 15:12

## 2018-06-09 NOTE — PROGRESS NOTES
Hospitalist Progress Note    NAME: Ant Olmstead   :  1926   MRN:  [id number]       Assessment / Plan:  Likely septic shock + hypovolemia shock POA   Gram positive bacteremia - contamination   -leukocytosis improving; MS is back to baseline   UA/ CT A/P and chest -all were negative on admission. 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 Follow up OP Dr Karo Coto or if still here re consult urology IP  -cont rapaflo     HTN  -BP stable   -started on Norvasc today   -PTA: was taking of hctz and started on lisinopril      Acute encephalopathy POA on baseline dementia  Dysphasia   -MS back to baseline; advanced dementia  -avoid oversedation   --seen by speech: dys 3/mech soft   -CT head negative for acute changes    YAMILET POA, resolved     :updated dtr over the phone, discussed current Dx/management and dc planning         Code Status: DNR/DNI   Surrogate Decision Maker: Daughter Cande Curiel  DVT Prophylaxis: SQ Heparin    Baseline:   Recommended Disposition: pending final PT recommendations for disposition ; hopefully DC on Monday. PT: SNF vs C      Subjective:     Chief Complaint / Reason for Physician Visit: following shock / encephalopathy   sitter at bedside    Agitated at a time     Discussed with RN events overnight.      Review of Systems:  Symptom Y/N Comments  Symptom Y/N Comments   Fever/Chills    Chest Pain     Poor Appetite    Edema     Cough    Abdominal Pain Sputum    Joint Pain     SOB/AMADOR    Pruritis/Rash     Nausea/vomit    Tolerating PT/OT     Diarrhea    Tolerating Diet     Constipation    Other       Could NOT obtain due to: Dementia      Objective:     VITALS:   Last 24hrs VS reviewed since prior progress note. Most recent are:  Patient Vitals for the past 24 hrs:   Temp Pulse Resp BP SpO2   06/09/18 1210 98.3 °F (36.8 °C) 84 16 123/64 97 %   06/09/18 0950 98 °F (36.7 °C) 90 16 147/69 98 %   06/09/18 0344 97.3 °F (36.3 °C) 75 16 153/76 99 %   06/08/18 2252 98.2 °F (36.8 °C) 71 12 150/63 98 %   06/08/18 1921 98.1 °F (36.7 °C) 83 18 151/60 99 %   06/08/18 1628 97.9 °F (36.6 °C) 78 18 145/70 98 %   06/08/18 1500 - 77 22 143/65 98 %   06/08/18 1400 - 94 18 (!) 154/101 100 %       Intake/Output Summary (Last 24 hours) at 06/09/18 1303  Last data filed at 06/09/18 0815   Gross per 24 hour   Intake           539.17 ml   Output             1600 ml   Net         -1060.83 ml        PHYSICAL EXAM:  General: WD, WN. Alert, cooperative, no acute distress    EENT:  EOMI. Anicteric sclerae. MMM  Resp:  CTA bilaterally, no wheezing or rales. No accessory muscle use  CV:  Regular  rhythm,  No edema  GI:  Soft, Non distended, Non tender.  +Bowel sounds  Neurologic:  AAO x 1, confused and agitated , sitter at bedside   Psych:   Poor insight. Not anxious nor agitated  Skin:  No rashes. No jaundice    Reviewed most current lab test results and cultures  YES  Reviewed most current radiology test results   YES  Review and summation of old records today    NO  Reviewed patient's current orders and MAR    YES  PMH/SH reviewed - no change compared to H&P  ________________________________________________________________________  Care Plan discussed with:    Comments   Patient y    Family      RN y    Care Manager     Consultant                        Multidiciplinary team rounds were held today with , nursing, pharmacist and clinical coordinator.   Patient's plan of care was discussed; medications were reviewed and discharge planning was addressed. ________________________________________________________________________  Total NON critical care TIME:  25  Minutes    Total CRITICAL CARE TIME Spent:   Minutes non procedure based      Comments   >50% of visit spent in counseling and coordination of care     ________________________________________________________________________  Vicenta Abrams MD     Procedures: see electronic medical records for all procedures/Xrays and details which were not copied into this note but were reviewed prior to creation of Plan. LABS:  I reviewed today's most current labs and imaging studies.   Pertinent labs include:  Recent Labs      06/09/18 0335 06/08/18   0337  06/06/18   1836   WBC  11.5*  14.5*  10.3   HGB  12.6  12.0*  12.5   HCT  37.2  35.2*  36.0*   PLT  144*  159  192     Recent Labs      06/09/18   0335  06/08/18   0337  06/07/18   0146  06/06/18   1836   NA  141  141   --   138   K  3.0*  3.0*   --   3.2*   CL  109*  108   --   102   CO2  23  25   --   26   GLU  86  121*   --   141*   BUN  5*  7   --   23*   CREA  0.72  0.76   --   1.54*   CA  8.6  8.3*   --   9.4   MG  1.6   --    --    --    PHOS  2.2*   --    --    --    ALB   --   3.2*  2.8*   --    TBILI   --   0.8  0.5   --    SGOT   --   20  14*   --    ALT   --   14  12   --        Signed: Vicenta Abrams MD

## 2018-06-09 NOTE — PROGRESS NOTES
Patient restless and agitated this am. Sitter and I transferred patient to chair. He is refusing vital signs and medicine at this time. Patient ambulate with assistance x2 to restroom. Liquid BM.       1420 Patient agitated and combative. Refusing lab draws for stat blood culture. 1435 PICC consulted for blood culture assistance. Patient combative and refusing lab draws from both members of the PICC team.     [phone] MD notified. Instructions to wait until patient calms down and re-attempt. 500 [facility] Merritt Mitchell attempts to draw blood cultures. Patient continues to be combative, agitated and refusing care. 1730 Patient remains agitated and uncooperative. Refusing medication and lab draws.

## 2018-06-10 LAB
ANION GAP SERPL CALC-SCNC: 10 MMOL/L (ref 5–15)
BUN SERPL-MCNC: 7 MG/DL (ref 6–20)
BUN/CREAT SERPL: 9 (ref 12–20)
CALCIUM SERPL-MCNC: 8.1 MG/DL (ref 8.5–10.1)
CHLORIDE SERPL-SCNC: 110 MMOL/L (ref 97–108)
CO2 SERPL-SCNC: 21 MMOL/L (ref 21–32)
CREAT SERPL-MCNC: 0.74 MG/DL (ref 0.7–1.3)
DATE LAST DOSE: NORMAL
ERYTHROCYTE [DISTWIDTH] IN BLOOD BY AUTOMATED COUNT: 12.7 % (ref 11.5–14.5)
GLUCOSE SERPL-MCNC: 103 MG/DL (ref 65–100)
HCT VFR BLD AUTO: 34.4 % (ref 36.6–50.3)
HGB BLD-MCNC: 11.8 G/DL (ref 12.1–17)
MCH RBC QN AUTO: 30.1 PG (ref 26–34)
MCHC RBC AUTO-ENTMCNC: 34.3 G/DL (ref 30–36.5)
MCV RBC AUTO: 87.8 FL (ref 80–99)
NRBC # BLD: 0 K/UL (ref 0–0.01)
NRBC BLD-RTO: 0 PER 100 WBC
PLATELET # BLD AUTO: 141 K/UL (ref 150–400)
PMV BLD AUTO: 11.3 FL (ref 8.9–12.9)
POTASSIUM SERPL-SCNC: 3.5 MMOL/L (ref 3.5–5.1)
RBC # BLD AUTO: 3.92 M/UL (ref 4.1–5.7)
REPORTED DOSE,DOSE: NORMAL UNITS
REPORTED DOSE/TIME,TMG: NORMAL
SODIUM SERPL-SCNC: 141 MMOL/L (ref 136–145)
VANCOMYCIN TROUGH SERPL-MCNC: 7.5 UG/ML (ref 5–10)
WBC # BLD AUTO: 10.1 K/UL (ref 4.1–11.1)

## 2018-06-10 PROCEDURE — 87040 BLOOD CULTURE FOR BACTERIA: CPT | Performed by: HOSPITALIST

## 2018-06-10 PROCEDURE — 74011250637 HC RX REV CODE- 250/637: Performed by: HOSPITALIST

## 2018-06-10 PROCEDURE — 36415 COLL VENOUS BLD VENIPUNCTURE: CPT | Performed by: HOSPITALIST

## 2018-06-10 PROCEDURE — 74011250637 HC RX REV CODE- 250/637: Performed by: INTERNAL MEDICINE

## 2018-06-10 PROCEDURE — 65270000029 HC RM PRIVATE

## 2018-06-10 PROCEDURE — 77030027138 HC INCENT SPIROMETER -A

## 2018-06-10 PROCEDURE — 85027 COMPLETE CBC AUTOMATED: CPT | Performed by: HOSPITALIST

## 2018-06-10 PROCEDURE — 74011250636 HC RX REV CODE- 250/636: Performed by: HOSPITALIST

## 2018-06-10 PROCEDURE — 74011250636 HC RX REV CODE- 250/636: Performed by: INTERNAL MEDICINE

## 2018-06-10 PROCEDURE — 80048 BASIC METABOLIC PNL TOTAL CA: CPT | Performed by: HOSPITALIST

## 2018-06-10 PROCEDURE — 80202 ASSAY OF VANCOMYCIN: CPT | Performed by: HOSPITALIST

## 2018-06-10 RX ADMIN — Medication 10 ML: at 05:02

## 2018-06-10 RX ADMIN — POTASSIUM CHLORIDE 20 MEQ: 1.5 POWDER, FOR SOLUTION ORAL at 17:00

## 2018-06-10 RX ADMIN — AMLODIPINE BESYLATE 5 MG: 5 TABLET ORAL at 09:36

## 2018-06-10 RX ADMIN — VANCOMYCIN HYDROCHLORIDE 1250 MG: 10 INJECTION, POWDER, LYOPHILIZED, FOR SOLUTION INTRAVENOUS at 20:30

## 2018-06-10 RX ADMIN — ASPIRIN 81 MG 81 MG: 81 TABLET ORAL at 09:36

## 2018-06-10 RX ADMIN — Medication 10 ML: at 15:58

## 2018-06-10 RX ADMIN — POTASSIUM CHLORIDE 20 MEQ: 1.5 POWDER, FOR SOLUTION ORAL at 09:36

## 2018-06-10 RX ADMIN — HEPARIN SODIUM 5000 UNITS: 5000 INJECTION, SOLUTION INTRAVENOUS; SUBCUTANEOUS at 09:37

## 2018-06-10 RX ADMIN — VANCOMYCIN HYDROCHLORIDE 1250 MG: 10 INJECTION, POWDER, LYOPHILIZED, FOR SOLUTION INTRAVENOUS at 02:30

## 2018-06-10 RX ADMIN — POTASSIUM CHLORIDE 20 MEQ: 1.5 POWDER, FOR SOLUTION ORAL at 15:57

## 2018-06-10 RX ADMIN — HEPARIN SODIUM 5000 UNITS: 5000 INJECTION, SOLUTION INTRAVENOUS; SUBCUTANEOUS at 21:11

## 2018-06-10 RX ADMIN — Medication 10 ML: at 21:12

## 2018-06-10 NOTE — PROGRESS NOTES
Patient resting peacefully at this time with sitter at bedside. No IV access. 1220 Patient agitated, uncooperative and combative. Refusing medication and vital signs. Patient is cursing at staff and attempting to hit.

## 2018-06-10 NOTE — PROGRESS NOTES
General Surgery End of Shift Nursing Note    Bedside shift change report given to 61Perry Lowery (oncoming nurse) by Vertie Shone RN (offgoing nurse). Report included the following information SBAR, Kardex, Intake/Output, MAR and Recent Results. Shift worked:   11p-7a   Summary of shift:    Pt continues with confusion. Sitter @ bedside. IV infiltrated. Multiple attempts to re-start without success. Lab work obtained. Issues for physician to address:  IV access difficult to obtain. If pt needs long term IV antibiotics. ....? PICC placement. Number times ambulated in hallway past shift: 0    Number of times OOB to chair past shift: 0    Pain Management:  Current medication: Tylenol  Patient states pain is manageable on current pain medication: YES    GI:    Current diet:  DIET DYSPHAGIA ADVANCED SOFT (NDD3)    Tolerating current diet: YES  Passing flatus: NO  Last Bowel Movement: yesterday    Respiratory:    Incentive Spirometer at bedside: YES  Patient instructed on use: YES    Patient Safety:    Falls Score: 3  Bed Alarm On? Yes  Sitter?  Yes    Oleg Simons RN

## 2018-06-10 NOTE — PROGRESS NOTES
Hospitalist Progress Note    NAME: Leandro Conner   :  1926   MRN:  [id number]       Assessment / Plan:  Likely septic shock + hypovolemia shock POA   Gram positive bacteremia - contamination   -leukocytosis resolved. MS is back to baseline   UA/ CT A/P and chest -all were negative on admission. 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 Follow up OP Dr Aruna Jacinto or if still here re consult urology IP  -cont rapaflo     HTN  -BP stable   -started on Norvasc this admission   -PTA: was taking of hctz and started on lisinopril      Acute encephalopathy POA on baseline dementia  Dysphasia   -MS back to baseline; advanced dementia  -avoid oversedation   --seen by speech: dys 3/mech soft   -CT head negative for acute changes    YAMILET POA, resolved     :updated dtr over the phone, discussed current Dx/management and dc planning         Code Status: DNR/DNI   Surrogate Decision Maker: Daughter Cande Curiel  DVT Prophylaxis: SQ Heparin    Baseline:   Recommended Disposition: pending final PT recommendations for disposition ; hopefully DC on Monday. PT: SNF vs C      Subjective:     Chief Complaint / Reason for Physician Visit: following shock / encephalopathy   sitter at bedside    Agitated at a time     Discussed with RN events overnight.      Review of Systems:  Symptom Y/N Comments  Symptom Y/N Comments   Fever/Chills    Chest Pain     Poor Appetite    Edema     Cough    Abdominal Pain     Sputum Joint Pain     SOB/AMADOR    Pruritis/Rash     Nausea/vomit    Tolerating PT/OT     Diarrhea    Tolerating Diet     Constipation    Other       Could NOT obtain due to: Dementia      Objective:     VITALS:   Last 24hrs VS reviewed since prior progress note. Most recent are:  Patient Vitals for the past 24 hrs:   Temp Pulse Resp BP SpO2   06/10/18 0922 97.7 °F (36.5 °C) 75 14 138/56 99 %   06/10/18 0400 97.2 °F (36.2 °C) 70 16 118/54 94 %   06/09/18 2307 95.4 °F (35.2 °C) 75 12 112/55 94 %   06/09/18 1901 97.9 °F (36.6 °C) 80 16 135/75 100 %       Intake/Output Summary (Last 24 hours) at 06/10/18 1327  Last data filed at 06/10/18 0400   Gross per 24 hour   Intake          1422.92 ml   Output                0 ml   Net          1422.92 ml        PHYSICAL EXAM:  General: WD, WN. Alert, cooperative, no acute distress    EENT:  EOMI. Anicteric sclerae. MMM  Resp:  CTA bilaterally, no wheezing or rales. No accessory muscle use  CV:  Regular  rhythm,  No edema  GI:  Soft, Non distended, Non tender.  +Bowel sounds  Neurologic:  AAO x 1, confused and agitated , sitter at bedside   Psych:   Poor insight. Not anxious nor agitated  Skin:  No rashes. No jaundice    Reviewed most current lab test results and cultures  YES  Reviewed most current radiology test results   YES  Review and summation of old records today    NO  Reviewed patient's current orders and MAR    YES  PMH/SH reviewed - no change compared to H&P  ________________________________________________________________________  Care Plan discussed with:    Comments   Patient y    Family      RN y    Care Manager     Consultant                        Multidiciplinary team rounds were held today with , nursing, pharmacist and clinical coordinator. Patient's plan of care was discussed; medications were reviewed and discharge planning was addressed.      ________________________________________________________________________  Total NON critical care TIME:  25 Minutes    Total CRITICAL CARE TIME Spent:   Minutes non procedure based      Comments   >50% of visit spent in counseling and coordination of care     ________________________________________________________________________  Lev Lockett MD     Procedures: see electronic medical records for all procedures/Xrays and details which were not copied into this note but were reviewed prior to creation of Plan. LABS:  I reviewed today's most current labs and imaging studies.   Pertinent labs include:  Recent Labs      06/10/18   0401  06/09/18   0335  06/08/18   0337   WBC  10.1  11.5*  14.5*   HGB  11.8*  12.6  12.0*   HCT  34.4*  37.2  35.2*   PLT  141*  144*  159     Recent Labs      06/10/18   0401  06/09/18   0335  06/08/18   0337   NA  141  141  141   K  3.5  3.0*  3.0*   CL  110*  109*  108   CO2  21  23  25   GLU  103*  86  121*   BUN  7  5*  7   CREA  0.74  0.72  0.76   CA  8.1*  8.6  8.3*   MG   --   1.6   --    PHOS   --   2.2*   --    ALB   --    --   3.2*   TBILI   --    --   0.8   SGOT   --    --   20   ALT   --    --   14       Signed: Lev Lockett MD

## 2018-06-11 ENCOUNTER — HOME HEALTH ADMISSION (OUTPATIENT)
Dept: HOME HEALTH SERVICES | Facility: HOME HEALTH | Age: 83
End: 2018-06-11
Payer: MEDICARE

## 2018-06-11 VITALS
HEART RATE: 79 BPM | DIASTOLIC BLOOD PRESSURE: 77 MMHG | TEMPERATURE: 98.8 F | HEIGHT: 64 IN | OXYGEN SATURATION: 98 % | SYSTOLIC BLOOD PRESSURE: 122 MMHG | BODY MASS INDEX: 26.84 KG/M2 | WEIGHT: 157.19 LBS | RESPIRATION RATE: 16 BRPM

## 2018-06-11 LAB
BACTERIA SPEC CULT: NORMAL
SERVICE CMNT-IMP: NORMAL

## 2018-06-11 PROCEDURE — 74011250637 HC RX REV CODE- 250/637: Performed by: INTERNAL MEDICINE

## 2018-06-11 PROCEDURE — 74011250637 HC RX REV CODE- 250/637: Performed by: HOSPITALIST

## 2018-06-11 RX ORDER — AMLODIPINE BESYLATE 5 MG/1
5 TABLET ORAL DAILY
Qty: 30 TAB | Refills: 0 | Status: SHIPPED | OUTPATIENT
Start: 2018-06-11

## 2018-06-11 RX ORDER — VANCOMYCIN HYDROCHLORIDE
1250 EVERY 12 HOURS
Status: DISCONTINUED | OUTPATIENT
Start: 2018-06-11 | End: 2018-06-11

## 2018-06-11 RX ADMIN — Medication 10 ML: at 06:05

## 2018-06-11 NOTE — PROGRESS NOTES
Pt is an 80 y.o.  female, admitted for dehydration. Pt wasn't able to verify information, however, CM contact pt's daughter: Remigio Fernando to verify pt's demographics. It was reported that pt resides with [facility], in her one story home (ramp into main entrance). Pt need assistance with ADLs, and he does not drive. Pt has personal care aide that comes 2 times a week, provided by the [facility].  Pt is active with PCP: goes to [facility] for follow up appointments. Pt uses [facility]). Pt has DME at home: wheelchair, walker, cane. There are no reports of HH and SNF. Pt will be d/c on today with home health provided by [employer]. CM will contact pt's NN: Merritt Cox , via in basket regarding pt's care. Pt's daughter aware of FOC and 2nd IM Medicare letter (signed) placed in chart (given verbal consent). Reason for Admission: dehydration                 RRAT Score:   24               Resources/supports as identified by patient/family:   Pt lives with daughter, has an personal care aide and will have home health                 Top Challenges facing patient (as identified by patient/family and CM): Finances/Medication cost?  Pt has insurance to cover the expenses of medication                  Transportation? Pt's daughter helps transport pt to and from medical appointments               Support system or lack thereof? Pt has support system                      Living arrangements? Pt lives with daughter            Self-care/ADLs/Cognition? Pt needs assistance with care           Current Advanced Directive/Advance Care Plan:  DNR                           Plan for utilizing home health:   Pt will return home with home health                       Likelihood of readmission: HIGH                 Transition of Care Plan:   Home with home health    Care Management Interventions  PCP Verified by CM:  Yes  Mode of Transport at Discharge: BLS ([facility] @6:30 )  Transition of Care Consult (CM Consult): [address]: Yes  Discharge Durable Medical Equipment: No  Physical Therapy Consult: Yes  Occupational Therapy Consult: Yes  Speech Therapy Consult: No  Current Support Network: Own Home, Relative's Home  Confirm Follow Up Transport: Family  Plan discussed with Pt/Family/Caregiver: Yes  Freedom of Choice Offered: Yes  Discharge Location  Discharge Placement: Home with home health    NOELLE Freeman   [phone]

## 2018-06-11 NOTE — DISCHARGE SUMMARY
Hospitalist Discharge Summary     Patient ID:  Radha Sood  [id number]  24 y.o.  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  male who presents with altered level of responsiveness. As per daughter who I spoke over the phone, pt was found to be barely responsive at home so his son check his blood pressure and found to be low so brought him to ED. Upon reviewing the medications with the daughter, daughter mentioned pt was taken off HCTZ and started on lisinopril just yesterday. Pt is disoriented due to dementia and unable to provide any meaningful information so history is limited. In ED pt noted to be hypotensive despite 4L of fluid boluses and started on Levophed.      We were asked to admit for work up and evaluation of the above problems. ______________________________________________________________________  DISCHARGE SUMMARY/HOSPITAL COURSE:  for full details see H&P, daily progress notes, labs, consult notes. Shock was likely from hypovolemia/ dehydration in nature POA   Gram positive bacteremia - likely contamination   -leukocytosis resolved. MS is back to baseline   UA/ CT A/P and chest -all were negative on admission.  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 Follow up OP Dr Donya Pete for free voiding trial   -cont rapaflo      HTN  -BP stable   -started on Norvasc this admission   -PTA: was taking off hctz and started on lisinopril       Acute encephalopathy POA on baseline dementia  Dysphasia   -MS back to baseline; advanced dementia  -avoid oversedation   --seen by speech: dys 3/[facility] soft   -CT head negative for acute changes     YAMILET POA, resolved      6/9:updated dtr over the phone, discussed current Dx/management and dc planning    6/10: dtr was updated at bedside             Code Status: DNR/DNI   Surrogate Decision Maker: Daughter Cande Curiel  DVT Prophylaxis: SQ Heparin     Baseline: lives with her dtr, ambulating independent   Recommended Disposition: [facility]   Pt will be benefiting from dc home due to his dementia. Declining care/PT/agitated.       _______________________________________________________________________  Patient seen and examined by me on discharge day. PHYSICAL EXAM:  General:                    WD, WN. Alert, cooperative, no acute distress    EENT:                                  EOMI. Anicteric sclerae. MMM  Resp:                                   CTA bilaterally, no wheezing or rales. No accessory muscle use  CV:                                      Regular  rhythm,  No edema  GI:                                       Soft, Non distended, Non tender.  +Bowel sounds  Neurologic:                AAO x 1, confused and agitated , sitter at bedside   Psych:                       Poor insight. Not anxious nor agitated  Skin:                                    No rashes.   No jaundice  _______________________________________________________________________  DISCHARGE MEDICATIONS:   Current Discharge Medication List      START taking these medications    Details   amLODIPine (NORVASC) 5 mg tablet Take 1 Tab by mouth daily. Qty: 30 Tab, Refills: 0         CONTINUE these medications which have NOT CHANGED    Details   KLOR-CON M10 10 mEq tablet TAKE 1 TABLET BY MOUTH EVERY DAY  Refills: 1      RAPAFLO 4 mg capsule Refills: 11      miconazole (MICOTIN) 2 % topical cream Apply  to affected area two (2) times a day. Qty: 42.5 g, Refills: 1    Associated Diagnoses: Tinea cruris      cholecalciferol, vitamin D3, (VITAMIN D3) 2,000 unit tab Take 400 Units by mouth. aspirin 81 mg chewable tablet Take 81 mg by mouth daily. STOP taking these medications       lisinopril (PRINIVIL, ZESTRIL) 10 mg tablet Comments:   Reason for Stopping:         hydrochlorothiazide (HYDRODIURIL) 25 mg tablet Comments:   Reason for Stopping:               My Recommended Diet, Activity, Wound Care, and follow-up labs are listed in the patient's Discharge Insturctions which I have personally completed and reviewed.     ______________________________________________________________________    Risk of deterioration: Low    Condition at Discharge:  Stable  ______________________________________________________________________    Disposition  Home with family and home health services  ______________________________________________________________________    Care Plan discussed with:   Patient, Family, RN, Care Manager    ______________________________________________________________________    Code Status: DNR/DNI  ______________________________________________________________________      Follow up with:   PCP : Shoaib Mccarty MD  Follow-up Information     Follow up With Details Comments Contact Info    Shoaib Mccarty MD In 1 week  [facility]   [address] [address]  [phone]      [facility] EMERGENCY DEPT  If symptoms worsen [address]    Miguel Medley MD In 1 week for free voiding trial  [address]  [phone]                Total time in minutes spent coordinating this discharge (includes going over instructions, follow-up, prescriptions, and preparing report for sign off to her PCP) :  > 30 minutes    Signed:  Lev Lockett MD

## 2018-06-11 NOTE — PROGRESS NOTES
Hospitalist Progress Note    NAME: Jose Tatum   :  1926   MRN:  [id number]       Assessment / Plan:  Shock was likely from hypovolemia/ dehydration in nature POA   Gram positive bacteremia - likely contamination   -leukocytosis resolved. MS is back to baseline   UA/ CT A/P and chest -all were negative on admission. 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 Follow up OP Dr Ailyn Phipps or if still here re consult urology IP  -cont rapaflo     HTN  -BP stable   -started on Norvasc this admission   -PTA: was taking of hctz and started on lisinopril      Acute encephalopathy POA on baseline dementia  Dysphasia   -MS back to baseline; advanced dementia  -avoid oversedation   --seen by speech: dys 3/mech soft   -CT head negative for acute changes    YAMILET POA, resolved     :updated dtr over the phone, discussed current Dx/management and dc planning    6/10: dtr was updated at bedside          Code Status: DNR/DNI   Surrogate Decision Maker: Daughter Cande Curiel  DVT Prophylaxis: SQ Heparin    Baseline: lives with her dtr, ambulating independent   Recommended Disposition: pending final PT recommendations for disposition and BC 6/10; hopefully DC later on today   PT: SNF vs C      Subjective:     Chief Complaint / Reason for Physician Visit: following shock / encephalopathy   MS: remain stable and at baseline     Discussed with RN events overnight.      Review of Systems:  Symptom Y/N Comments  Symptom Y/N Comments   Fever/Chills    Chest Pain     Poor Appetite    Edema     Cough    Abdominal Pain     Sputum    Joint Pain     SOB/AMADOR    Pruritis/Rash     Nausea/vomit    Tolerating PT/OT     Diarrhea    Tolerating Diet     Constipation    Other       Could NOT obtain due to: Dementia      Objective:     VITALS:   Last 24hrs VS reviewed since prior progress note. Most recent are:  Patient Vitals for the past 24 hrs:   Temp Pulse Resp BP SpO2   06/11/18 0415 97.4 °F (36.3 °C) 63 18 130/57 98 %   06/10/18 2000 98.4 °F (36.9 °C) 88 16 130/74 100 %   06/10/18 1543 98.1 °F (36.7 °C) 91 14 127/78 100 %   06/10/18 1530 98.1 °F (36.7 °C) 87 14 112/55 99 %   06/10/18 0922 97.7 °F (36.5 °C) 75 14 138/56 99 %       Intake/Output Summary (Last 24 hours) at 06/11/18 0814  Last data filed at 06/11/18 0400   Gross per 24 hour   Intake                0 ml   Output              500 ml   Net             -500 ml        PHYSICAL EXAM:  General: WD, WN. Alert, cooperative, no acute distress    EENT:  EOMI. Anicteric sclerae. MMM  Resp:  CTA bilaterally, no wheezing or rales. No accessory muscle use  CV:  Regular  rhythm,  No edema  GI:  Soft, Non distended, Non tender.  +Bowel sounds  Neurologic:  AAO x 1, confused and agitated , sitter at bedside   Psych:   Poor insight. Not anxious nor agitated  Skin:  No rashes. No jaundice    Reviewed most current lab test results and cultures  YES  Reviewed most current radiology test results   YES  Review and summation of old records today    NO  Reviewed patient's current orders and MAR    YES  PMH/SH reviewed - no change compared to H&P  ________________________________________________________________________  Care Plan discussed with:    Comments   Patient y    Family      RN y    Care Manager     Consultant                        Multidiciplinary team rounds were held today with , nursing, pharmacist and clinical coordinator.   Patient's plan of care was discussed; medications were reviewed and discharge planning was addressed. ________________________________________________________________________  Total NON critical care TIME:  25  Minutes    Total CRITICAL CARE TIME Spent:   Minutes non procedure based      Comments   >50% of visit spent in counseling and coordination of care     ________________________________________________________________________  Helen Vang MD     Procedures: see electronic medical records for all procedures/Xrays and details which were not copied into this note but were reviewed prior to creation of Plan. LABS:  I reviewed today's most current labs and imaging studies.   Pertinent labs include:  Recent Labs      06/10/18   0401  06/09/18   0335   WBC  10.1  11.5*   HGB  11.8*  12.6   HCT  34.4*  37.2   PLT  141*  144*     Recent Labs      06/10/18   0401  06/09/18   0335   NA  141  141   K  3.5  3.0*   CL  110*  109*   CO2  21  23   GLU  103*  86   BUN  7  5*   CREA  0.74  0.72   CA  8.1*  8.6   MG   --   1.6   PHOS   --   2.2*       Signed: Helen Vang MD

## 2018-06-11 NOTE — PROGRESS NOTES
General Surgery End of Shift Nursing Note    Bedside shift change report given to Miguelina Hinojosa (oncoming nurse) by Bozena Cherry RN (offgoing nurse). Report included the following information SBAR, Kardex, Intake/Output, MAR and Recent Results. Shift worked:   7p-7a   Summary of shift:    Pt slept in recliner most of the shift. No behavioral issues @ this time. Sitter @ bedside. Issues for physician to address:   N/A     Number times ambulated in hallway past shift: 0     Pain Management:  Current medication: Tylenol  Patient states pain is manageable on current pain medication: YES    GI:    Current diet:  DIET DYSPHAGIA ADVANCED SOFT (NDD3)    Tolerating current diet: YES  Passing flatus: NO  Last Bowel Movement: several days ago    Respiratory:    Incentive Spirometer at bedside: YES  Patient instructed on use: Pt unable. Patient Safety:    Falls Score: 3  Bed Alarm On? Yes  Sitter?  Yes    Prateek Zhu RN

## 2018-06-11 NOTE — DISCHARGE INSTRUCTIONS
Patient Discharge Instructions    Tor Dueñas / [id number] : 1926    Admitted 2018 Discharged: 2018         DISCHARGE DIAGNOSIS:     Low blood pressure was likely due to dehydration   - drink plenty of fluids     Urinary retention   -Follow with Dr Armen Cohen in 1 week for free voiding trial     HTN (hypertension)   -you were started on new blood pressure medication Norvasc         Take Home Medications     You were treated with antibiotic while in the hopsital   One of the most important side effect to watch while taking antibiotic or after you just finished taking it is diarrhea. This type of diarrhea may be caused by an infection with bacteria called C. difficile. C. difficile normally lives in the intestines. When people are on antibiotics, the C. difficile in their intestines can overgrow and cause infection. If you develop any side effect especially diarrhea while taking antibiotics it is very important to contact your doctor. General drug facts     If you have a very bad allergy, wear an allergy ID at all times. It is important that you take the medication exactly as they are prescribed. Keep your medication in the bottles provided by the pharmacist.  Keep a list of all your drugs (prescription, natural products, vitamins, OTC) with you. Give this list to your doctor. Do not take other medications without consulting your doctor. Do not share your drugs with others and do not take anyone else's drugs. Keep all drugs out of the reach of children and pets. Most drugs may be thrown away in household trash after mixing with coffee grounds or sis litter and sealing in a plastic bag. Keep a list Call your doctor for help with any side effects. If in the [place] you may also call the FDA at 2-963-FDA-6407    Talk with the doctor before starting any new drug, including OTC, natural products, or vitamins. What to do at Home    1.  Recommended diet: dysphasia 3 - mechanically soft     2. Recommended activity: Activity as tolerated and PT/OT Eval and Treat    3. If you experience any of the following symptoms then please call your primary care physician or return to the emergency room if you cannot get hold of your doctor:fever/chills/dizziness/weakness     4. Lab work: with PCP     5. Bring these papers with you to your follow up appointments. The papers will help your doctors be sure to continue the care plan from the hospital.      Follow-up with:   PCP: Tramaine Sprague MD  Follow-up Information     Follow up With Details Comments Contact Info    Tramaine Sprague MD In 1 week  [address] [address] [address] [address]      Noemi Soto MD In 1 week for free voiding trial  [address]  [phone]             Please call for your own appointment        Information obtained by :  I understand that if any problems occur once I am at home I am to contact my physician. I understand and acknowledge receipt of the instructions indicated above.                                                                                                                                            Physician's or R.N.'s Signature                                                                  Date/Time                                                                                                                                              Patient or Representative Signature                                                          Date/Time

## 2018-06-11 NOTE — PROGRESS NOTES
Pharmacy Medication Reconciliation     Unable to interview patient regarding current PTA medication list, use and drug allergies. Was able to speak with patient's daughter over the phone. She was questioned regarding use of any other inhalers, topical products, over the counter medications, herbal medications, vitamin products or ophthalmic/nasal/otic medication use. Allergy Update: None    Recommendations/Findings: The following amendments were made to the patient's active medication list on file at [facility]:   1) Additions: None    2) Deletions:   Hydrochlorothiazide 25 mg: Take one tablet by mouth daily  Klor-con 10 mEq: Take one tablet by mouth daily   Amlodipine 5 mg: Take one tablet by mouth daily     3) Changes: None      -Clarified PTA med list with Patients Daughter. PTA medication list was corrected to the following:     Prior to Admission Medications   Prescriptions Last Dose Informant Patient Reported? Taking? RAPAFLO 4 mg capsule   Yes No   Sig: Take 4 mg by mouth daily (with breakfast). aspirin 81 mg chewable tablet  Child Yes No   Sig: Take 81 mg by mouth daily. cholecalciferol, vitamin D3, (VITAMIN D3) 2,000 unit tab  Child Yes No   Sig: Take 400 Units by mouth daily. lisinopril (PRINIVIL, ZESTRIL) 10 mg tablet  Child Yes Yes   Sig: Take 10 mg by mouth daily. miconazole (MICOTIN) 2 % topical cream  Child No No   Sig: Apply  to affected area two (2) times a day. Patient taking differently: Apply  to affected area two (2) times daily as needed.       Facility-Administered Medications: None          Thank you,  Herson Eckert, PharmD candidate 2019  [facility]

## 2018-06-11 NOTE — CARDIO/PULMONARY
Cardiopulmonary rehab:    Chart reviewed. Pt is on th CHF bundle list. Pt is a 80 y.o. male admitted with shock liver. PMH includes HTN, dementia, H/O prostate CA, pacemaker, CHF, CAD. Nonsmoker. No Echo in connect care. HF does not appear active, no cardiologist following. No CHF teaching is indicated at this time.

## 2018-06-11 NOTE — PROGRESS NOTES
Vancomycin trough was reported to be 7.5. Actual trough was 6.3. Vancomycin dosing adjusted to 1250 mg iv q12h to achieve an estimated peak/trough of 38.4 and 16.18.    MIGUEL AnneD

## 2018-06-11 NOTE — ROUTINE PROCESS
PCP EUN appt scheduled with Dr. Jeet Zamorano on 6/18/18 at 12:00PM. Appt added to [facility] Worley [facility] Specialist

## 2018-06-11 NOTE — PROGRESS NOTES
Home Health Care Discharge Planning: [facility]  Face to Face Encounter      NAME: Marivel Kirby   :  1926   MRN:  [id number]     Primary Diagnosis: Dehydration     Date of Face to Face:  2018 8:31 AM                                  Face to Face Encounter findings are related to primary reason for home care:   YES    1. I certify that the patient needs intermittent skilled nursing care, physical therapy and/or speech therapy. I will not be following this patient in the Community and Dr. Bhaskar Delvalle MD will be responsible for signing the [address] of Care. 2. Initial Orders for Care: Skilled Nursing, Physical Therapy and Occupational Therapy    3. I certify that this patient is homebound because of illness or injury, need the aid of supportive devices such as crutches, canes, wheelchairs, and walkers; the use of special transportation; or the assistance of another person in order to leave their place of residence. There exists a normal inability to leave home and leaving home requires a considerable and taxing effort. 4. I certify that this patient is under my care and that I had a Face-to-Face Encounter that meets the physician Face-to-Face Encounter requirements. Document the physical findings from the Face-to-Face Encounter that support the need for skilled services: Has new diagnosis that requires skilled nursing teaching and intervention , Has new medications that requires skilled nursing teaching and monitoring for understanding and compliance , Needs skilled safety assessment and interventions  and Has new finding of weakness and altered mobility that requires skilled physical/occupational and/or speech therapy services for evaluation and interventions.      Don Hay MD  Discharging Physician  Office: [phone]  Fax:   [phone]

## 2018-06-11 NOTE — CDMP QUERY
Please clarify if this patient is (was) being treated/managed for:     => Severe Sepsis POA hypovolemic shock with organ dysfunction(encephalopathy/renal insufficiency) in setting of severe hypotension,   => Other explanation of clinical findings  => Clinically Undetermined (no explanation for clinical findings)    The medical record reflects the following clinical findings, treatment, and risk factors. Risk Factors:  presents for hypotension  Clinical Indicators:  BP 71/44---89/40-71/39-68/33, BP remains low despite 4 liters NS,  Treatment: 4 liters NS, Levophed gtt, bld/urine culture pending    Please clarify and document your clinical opinion in the progress notes and discharge summary including the definitive and/or presumptive diagnosis, (suspected or probable), related to the above clinical findings. Please include clinical findings supporting your diagnosis. Thank Toby Crespo RN [address] [place] [address]; DDO;[id number]

## 2018-06-11 NOTE — INTERDISCIPLINARY ROUNDS
Interdisciplinary Rounds were completed on this patient. Rounds included nursing, clinical care leader, pharmacy, and case management. Plan for the day: possible DC today?, sitter at bedside  Plan for the stay: continue current [place]  Activity: up in chair   Anticipated discharge date: possible home 6/11? ?

## 2018-06-11 NOTE — PROGRESS NOTES
Visit attempted pt very adamant about not wanting PT today saying no to all mobility. Will continue to follow.

## 2018-06-12 ENCOUNTER — PATIENT OUTREACH (OUTPATIENT)
Dept: FAMILY MEDICINE CLINIC | Age: 83
End: 2018-06-12

## 2018-06-12 LAB
BACTERIA SPEC CULT: ABNORMAL
BACTERIA SPEC CULT: ABNORMAL
SERVICE CMNT-IMP: ABNORMAL

## 2018-06-12 NOTE — PROGRESS NOTES
Hospital Discharge Follow-Up      Date/Time:  2018 9:00 AM    Patient was admitted to [address] [place] [address] on 18 and discharged on 18 for shock. The physician discharge summary was available at the time of outreach. Patient was contacted within 1 business days of discharge. ? If Dr Quiana Phelps is PCP LOV [facility]  Top Challenges reviewed with the provider   none         Method of communication with provider :none    Inpatient RRAT score: 24  Was this a readmission? no   Patient stated reason for the readmission: N/A    Nurse Navigator (NN) contacted the family by telephone to perform post hospital discharge assessment. Verified name and  with family as identifiers. Provided introduction to self, and explanation of the Nurse Navigator role. Reviewed discharge instructions and red flags with family who verbalized understanding. Family given an opportunity to ask questions and does not have any further questions or concerns at this time. The family agrees to contact the PCP office for questions related to their healthcare. NN provided contact information for future reference. Disease Specific:   N/A     Summary of patient's top problems:  1. dementia  2. CHF patient is 80 and daughter and provider are okay with lessen  limits for patient    Home Health orders at discharge: [address]: [facility]  Date of initial visit: TBD    Durable Medical Equipment ordered/company: none  Durable Medical Equipment received: N/A    Barriers to care? age, lack of knowledge about disease    Advance Care Planning:   Does patient have an Advance Directive:  not on file Daughter has POA  Medication(s):     New Medications at Discharge: Amlodipine  Changed Medications at Discharge: none  Discontinued Medications at Discharge: HCTZ, Lisinopril    Medication reconciliation was performed with family, who verbalizes understanding of administration of home medications. There were no barriers to obtaining medications identified at this time. Referral to Pharm D needed: no     Current Outpatient Prescriptions   Medication Sig    amLODIPine (NORVASC) 5 mg tablet Take 1 Tab by mouth daily.  RAPAFLO 4 mg capsule Take 4 mg by mouth daily (with breakfast).  miconazole (MICOTIN) 2 % topical cream Apply  to affected area two (2) times a day. (Patient taking differently: Apply  to affected area two (2) times daily as needed.)    cholecalciferol, vitamin D3, (VITAMIN D3) 2,000 unit tab Take 400 Units by mouth daily.  aspirin 81 mg chewable tablet Take 81 mg by mouth daily. No current facility-administered medications for this visit. There are no discontinued medications. PCP/Specialist follow up: Future Appointments  Date Time Provider Ana Joshi   6/18/2018 12:00 PM Luis Castro MD [address] [facility] Attends follow-up appointments as directed. 6/12/18 daughter reports [facility]. Writer advised daughter ro contact Home Based Primary care thru [place] to advised patient needs a visit. Daughter given an opportunity to ask questions, repeated information, and verbalized understanding.  PETE

## 2018-06-13 ENCOUNTER — HOME CARE VISIT (OUTPATIENT)
Dept: SCHEDULING | Facility: HOME HEALTH | Age: 83
End: 2018-06-13
Payer: MEDICARE

## 2018-06-13 PROCEDURE — 3331090001 HH PPS REVENUE CREDIT

## 2018-06-13 PROCEDURE — G0299 HHS/HOSPICE OF RN EA 15 MIN: HCPCS

## 2018-06-13 PROCEDURE — 400013 HH SOC

## 2018-06-13 PROCEDURE — 3331090002 HH PPS REVENUE DEBIT

## 2018-06-14 PROCEDURE — 3331090001 HH PPS REVENUE CREDIT

## 2018-06-14 PROCEDURE — 3331090002 HH PPS REVENUE DEBIT

## 2018-06-15 ENCOUNTER — HOME CARE VISIT (OUTPATIENT)
Dept: HOME HEALTH SERVICES | Facility: HOME HEALTH | Age: 83
End: 2018-06-15
Payer: MEDICARE

## 2018-06-15 ENCOUNTER — HOME CARE VISIT (OUTPATIENT)
Dept: SCHEDULING | Facility: HOME HEALTH | Age: 83
End: 2018-06-15
Payer: MEDICARE

## 2018-06-15 VITALS
RESPIRATION RATE: 18 BRPM | DIASTOLIC BLOOD PRESSURE: 78 MMHG | TEMPERATURE: 97.8 F | HEART RATE: 83 BPM | OXYGEN SATURATION: 90 % | SYSTOLIC BLOOD PRESSURE: 128 MMHG

## 2018-06-15 VITALS
OXYGEN SATURATION: 96 % | TEMPERATURE: 98.1 F | SYSTOLIC BLOOD PRESSURE: 100 MMHG | HEART RATE: 79 BPM | DIASTOLIC BLOOD PRESSURE: 64 MMHG | RESPIRATION RATE: 20 BRPM

## 2018-06-15 PROCEDURE — 3331090002 HH PPS REVENUE DEBIT

## 2018-06-15 PROCEDURE — 3331090001 HH PPS REVENUE CREDIT

## 2018-06-15 PROCEDURE — G0299 HHS/HOSPICE OF RN EA 15 MIN: HCPCS

## 2018-06-16 LAB
BACTERIA SPEC CULT: NORMAL
SERVICE CMNT-IMP: NORMAL

## 2018-06-16 PROCEDURE — 3331090001 HH PPS REVENUE CREDIT

## 2018-06-16 PROCEDURE — 3331090002 HH PPS REVENUE DEBIT

## 2018-06-17 VITALS
HEART RATE: 86 BPM | OXYGEN SATURATION: 96 % | RESPIRATION RATE: 20 BRPM | SYSTOLIC BLOOD PRESSURE: 100 MMHG | DIASTOLIC BLOOD PRESSURE: 50 MMHG | TEMPERATURE: 97.8 F

## 2018-06-17 PROCEDURE — 3331090002 HH PPS REVENUE DEBIT

## 2018-06-17 PROCEDURE — 3331090001 HH PPS REVENUE CREDIT

## 2018-06-18 ENCOUNTER — HOME CARE VISIT (OUTPATIENT)
Dept: HOME HEALTH SERVICES | Facility: HOME HEALTH | Age: 83
End: 2018-06-18
Payer: MEDICARE

## 2018-06-18 PROCEDURE — 3331090001 HH PPS REVENUE CREDIT

## 2018-06-18 PROCEDURE — 3331090002 HH PPS REVENUE DEBIT

## 2018-06-19 ENCOUNTER — PATIENT OUTREACH (OUTPATIENT)
Dept: FAMILY MEDICINE CLINIC | Age: 83
End: 2018-06-19

## 2018-06-19 ENCOUNTER — HOME CARE VISIT (OUTPATIENT)
Dept: SCHEDULING | Facility: HOME HEALTH | Age: 83
End: 2018-06-19
Payer: MEDICARE

## 2018-06-19 PROCEDURE — 3331090001 HH PPS REVENUE CREDIT

## 2018-06-19 PROCEDURE — G0299 HHS/HOSPICE OF RN EA 15 MIN: HCPCS

## 2018-06-19 PROCEDURE — 3331090002 HH PPS REVENUE DEBIT

## 2018-06-19 NOTE — PROGRESS NOTES
Goals Addressed      Attends follow-up appointments as directed. 6/12/18 daughter reports [facility] just [facility]. Writer advised daughter ro contact Home Based Primary care thru [place] to advised patient needs a visit. Daughter given an opportunity to ask questions, repeated information, and verbalized understanding. PETE    6/19/18 Daughter reports patient was seen by [place] home based PCP on 6/15/18. Home health is working with patient. Will monitor and next week outreach.  PETE

## 2018-06-20 ENCOUNTER — HOME CARE VISIT (OUTPATIENT)
Dept: SCHEDULING | Facility: HOME HEALTH | Age: 83
End: 2018-06-20
Payer: MEDICARE

## 2018-06-20 VITALS
OXYGEN SATURATION: 97 % | TEMPERATURE: 97.5 F | HEART RATE: 96 BPM | DIASTOLIC BLOOD PRESSURE: 62 MMHG | SYSTOLIC BLOOD PRESSURE: 110 MMHG | RESPIRATION RATE: 20 BRPM

## 2018-06-20 PROCEDURE — G0152 HHCP-SERV OF OT,EA 15 MIN: HCPCS

## 2018-06-20 PROCEDURE — 3331090002 HH PPS REVENUE DEBIT

## 2018-06-20 PROCEDURE — 3331090001 HH PPS REVENUE CREDIT

## 2018-06-21 ENCOUNTER — HOME CARE VISIT (OUTPATIENT)
Dept: SCHEDULING | Facility: HOME HEALTH | Age: 83
End: 2018-06-21
Payer: MEDICARE

## 2018-06-21 VITALS
SYSTOLIC BLOOD PRESSURE: 120 MMHG | HEART RATE: 73 BPM | TEMPERATURE: 97.7 F | DIASTOLIC BLOOD PRESSURE: 70 MMHG | OXYGEN SATURATION: 98 % | RESPIRATION RATE: 18 BRPM

## 2018-06-21 VITALS
OXYGEN SATURATION: 98 % | TEMPERATURE: 98 F | SYSTOLIC BLOOD PRESSURE: 120 MMHG | DIASTOLIC BLOOD PRESSURE: 60 MMHG | HEART RATE: 76 BPM

## 2018-06-21 PROCEDURE — G0299 HHS/HOSPICE OF RN EA 15 MIN: HCPCS

## 2018-06-21 PROCEDURE — 3331090001 HH PPS REVENUE CREDIT

## 2018-06-21 PROCEDURE — 3331090002 HH PPS REVENUE DEBIT

## 2018-06-22 PROCEDURE — 3331090002 HH PPS REVENUE DEBIT

## 2018-06-22 PROCEDURE — 3331090001 HH PPS REVENUE CREDIT

## 2018-06-23 ENCOUNTER — HOME CARE VISIT (OUTPATIENT)
Dept: SCHEDULING | Facility: HOME HEALTH | Age: 83
End: 2018-06-23
Payer: MEDICARE

## 2018-06-23 PROCEDURE — G0300 HHS/HOSPICE OF LPN EA 15 MIN: HCPCS

## 2018-06-23 PROCEDURE — 3331090002 HH PPS REVENUE DEBIT

## 2018-06-23 PROCEDURE — 3331090001 HH PPS REVENUE CREDIT

## 2018-06-24 PROCEDURE — 3331090002 HH PPS REVENUE DEBIT

## 2018-06-24 PROCEDURE — 3331090001 HH PPS REVENUE CREDIT

## 2018-06-25 ENCOUNTER — HOME CARE VISIT (OUTPATIENT)
Dept: SCHEDULING | Facility: HOME HEALTH | Age: 83
End: 2018-06-25
Payer: MEDICARE

## 2018-06-25 VITALS
SYSTOLIC BLOOD PRESSURE: 116 MMHG | HEART RATE: 92 BPM | DIASTOLIC BLOOD PRESSURE: 66 MMHG | RESPIRATION RATE: 16 BRPM | OXYGEN SATURATION: 95 % | TEMPERATURE: 97.2 F

## 2018-06-25 PROCEDURE — 3331090002 HH PPS REVENUE DEBIT

## 2018-06-25 PROCEDURE — G0300 HHS/HOSPICE OF LPN EA 15 MIN: HCPCS

## 2018-06-25 PROCEDURE — 3331090001 HH PPS REVENUE CREDIT

## 2018-06-26 ENCOUNTER — HOME CARE VISIT (OUTPATIENT)
Dept: HOME HEALTH SERVICES | Facility: HOME HEALTH | Age: 83
End: 2018-06-26
Payer: MEDICARE

## 2018-06-26 VITALS
TEMPERATURE: 98 F | DIASTOLIC BLOOD PRESSURE: 50 MMHG | RESPIRATION RATE: 17 BRPM | HEART RATE: 88 BPM | OXYGEN SATURATION: 97 % | SYSTOLIC BLOOD PRESSURE: 90 MMHG

## 2018-06-26 PROCEDURE — 3331090002 HH PPS REVENUE DEBIT

## 2018-06-26 PROCEDURE — 3331090001 HH PPS REVENUE CREDIT

## 2018-06-27 ENCOUNTER — HOME CARE VISIT (OUTPATIENT)
Dept: HOME HEALTH SERVICES | Facility: HOME HEALTH | Age: 83
End: 2018-06-27
Payer: MEDICARE

## 2018-06-27 ENCOUNTER — HOME CARE VISIT (OUTPATIENT)
Dept: SCHEDULING | Facility: HOME HEALTH | Age: 83
End: 2018-06-27
Payer: MEDICARE

## 2018-06-27 VITALS
TEMPERATURE: 98.1 F | DIASTOLIC BLOOD PRESSURE: 82 MMHG | OXYGEN SATURATION: 96 % | SYSTOLIC BLOOD PRESSURE: 117 MMHG | HEART RATE: 83 BPM

## 2018-06-27 PROCEDURE — G0299 HHS/HOSPICE OF RN EA 15 MIN: HCPCS

## 2018-06-27 PROCEDURE — 3331090002 HH PPS REVENUE DEBIT

## 2018-06-27 PROCEDURE — 3331090001 HH PPS REVENUE CREDIT

## 2018-06-28 ENCOUNTER — HOME CARE VISIT (OUTPATIENT)
Dept: SCHEDULING | Facility: HOME HEALTH | Age: 83
End: 2018-06-28
Payer: MEDICARE

## 2018-06-28 PROCEDURE — 3331090001 HH PPS REVENUE CREDIT

## 2018-06-28 PROCEDURE — 3331090002 HH PPS REVENUE DEBIT

## 2018-06-29 ENCOUNTER — HOME CARE VISIT (OUTPATIENT)
Dept: SCHEDULING | Facility: HOME HEALTH | Age: 83
End: 2018-06-29
Payer: MEDICARE

## 2018-06-29 PROCEDURE — G0300 HHS/HOSPICE OF LPN EA 15 MIN: HCPCS

## 2018-06-29 PROCEDURE — 3331090001 HH PPS REVENUE CREDIT

## 2018-06-29 PROCEDURE — 3331090002 HH PPS REVENUE DEBIT

## 2018-06-30 VITALS
OXYGEN SATURATION: 96 % | DIASTOLIC BLOOD PRESSURE: 64 MMHG | HEART RATE: 92 BPM | TEMPERATURE: 98.1 F | RESPIRATION RATE: 16 BRPM | SYSTOLIC BLOOD PRESSURE: 118 MMHG

## 2018-06-30 PROCEDURE — 3331090001 HH PPS REVENUE CREDIT

## 2018-06-30 PROCEDURE — 3331090002 HH PPS REVENUE DEBIT

## 2018-07-01 PROCEDURE — 3331090001 HH PPS REVENUE CREDIT

## 2018-07-01 PROCEDURE — 3331090002 HH PPS REVENUE DEBIT

## 2018-07-02 PROCEDURE — 3331090002 HH PPS REVENUE DEBIT

## 2018-07-02 PROCEDURE — 3331090001 HH PPS REVENUE CREDIT

## 2018-07-03 ENCOUNTER — HOME CARE VISIT (OUTPATIENT)
Dept: SCHEDULING | Facility: HOME HEALTH | Age: 83
End: 2018-07-03
Payer: MEDICARE

## 2018-07-03 ENCOUNTER — HOME CARE VISIT (OUTPATIENT)
Dept: HOME HEALTH SERVICES | Facility: HOME HEALTH | Age: 83
End: 2018-07-03
Payer: MEDICARE

## 2018-07-03 PROCEDURE — 3331090001 HH PPS REVENUE CREDIT

## 2018-07-03 PROCEDURE — 3331090002 HH PPS REVENUE DEBIT

## 2018-07-03 PROCEDURE — G0299 HHS/HOSPICE OF RN EA 15 MIN: HCPCS

## 2018-07-04 VITALS
OXYGEN SATURATION: 98 % | HEART RATE: 70 BPM | TEMPERATURE: 98 F | RESPIRATION RATE: 18 BRPM | SYSTOLIC BLOOD PRESSURE: 122 MMHG | DIASTOLIC BLOOD PRESSURE: 70 MMHG

## 2018-07-04 PROCEDURE — 3331090002 HH PPS REVENUE DEBIT

## 2018-07-04 PROCEDURE — 3331090001 HH PPS REVENUE CREDIT

## 2018-07-05 ENCOUNTER — HOME CARE VISIT (OUTPATIENT)
Dept: SCHEDULING | Facility: HOME HEALTH | Age: 83
End: 2018-07-05
Payer: MEDICARE

## 2018-07-05 VITALS
SYSTOLIC BLOOD PRESSURE: 118 MMHG | DIASTOLIC BLOOD PRESSURE: 72 MMHG | RESPIRATION RATE: 16 BRPM | HEART RATE: 83 BPM | TEMPERATURE: 98.1 F | OXYGEN SATURATION: 97 %

## 2018-07-05 PROCEDURE — 3331090002 HH PPS REVENUE DEBIT

## 2018-07-05 PROCEDURE — 3331090001 HH PPS REVENUE CREDIT

## 2018-07-05 PROCEDURE — G0300 HHS/HOSPICE OF LPN EA 15 MIN: HCPCS

## 2018-07-06 PROCEDURE — 3331090001 HH PPS REVENUE CREDIT

## 2018-07-06 PROCEDURE — 3331090002 HH PPS REVENUE DEBIT

## 2018-07-07 PROCEDURE — 3331090002 HH PPS REVENUE DEBIT

## 2018-07-07 PROCEDURE — 3331090001 HH PPS REVENUE CREDIT

## 2018-07-08 PROCEDURE — 3331090001 HH PPS REVENUE CREDIT

## 2018-07-08 PROCEDURE — 3331090002 HH PPS REVENUE DEBIT

## 2018-07-09 ENCOUNTER — HOME CARE VISIT (OUTPATIENT)
Dept: SCHEDULING | Facility: HOME HEALTH | Age: 83
End: 2018-07-09
Payer: MEDICARE

## 2018-07-09 VITALS
HEART RATE: 76 BPM | TEMPERATURE: 98 F | SYSTOLIC BLOOD PRESSURE: 130 MMHG | DIASTOLIC BLOOD PRESSURE: 70 MMHG | OXYGEN SATURATION: 98 %

## 2018-07-09 PROCEDURE — G0152 HHCP-SERV OF OT,EA 15 MIN: HCPCS

## 2018-07-09 PROCEDURE — 3331090002 HH PPS REVENUE DEBIT

## 2018-07-09 PROCEDURE — 3331090001 HH PPS REVENUE CREDIT

## 2018-07-10 ENCOUNTER — HOME CARE VISIT (OUTPATIENT)
Dept: SCHEDULING | Facility: HOME HEALTH | Age: 83
End: 2018-07-10
Payer: MEDICARE

## 2018-07-10 VITALS
OXYGEN SATURATION: 97 % | RESPIRATION RATE: 16 BRPM | TEMPERATURE: 97.4 F | DIASTOLIC BLOOD PRESSURE: 78 MMHG | HEART RATE: 79 BPM | SYSTOLIC BLOOD PRESSURE: 116 MMHG

## 2018-07-10 PROCEDURE — G0300 HHS/HOSPICE OF LPN EA 15 MIN: HCPCS

## 2018-07-10 PROCEDURE — 3331090002 HH PPS REVENUE DEBIT

## 2018-07-10 PROCEDURE — 3331090001 HH PPS REVENUE CREDIT

## 2018-07-11 PROCEDURE — 3331090002 HH PPS REVENUE DEBIT

## 2018-07-11 PROCEDURE — 3331090001 HH PPS REVENUE CREDIT

## 2018-07-12 ENCOUNTER — HOME CARE VISIT (OUTPATIENT)
Dept: SCHEDULING | Facility: HOME HEALTH | Age: 83
End: 2018-07-12
Payer: MEDICARE

## 2018-07-12 VITALS
SYSTOLIC BLOOD PRESSURE: 119 MMHG | DIASTOLIC BLOOD PRESSURE: 68 MMHG | RESPIRATION RATE: 18 BRPM | HEART RATE: 81 BPM | OXYGEN SATURATION: 99 % | TEMPERATURE: 97.7 F

## 2018-07-12 PROCEDURE — 3331090001 HH PPS REVENUE CREDIT

## 2018-07-12 PROCEDURE — 3331090002 HH PPS REVENUE DEBIT

## 2018-07-12 PROCEDURE — G0299 HHS/HOSPICE OF RN EA 15 MIN: HCPCS

## 2018-07-13 ENCOUNTER — PATIENT OUTREACH (OUTPATIENT)
Dept: FAMILY MEDICINE CLINIC | Age: 83
End: 2018-07-13

## 2018-07-13 PROCEDURE — 3331090001 HH PPS REVENUE CREDIT

## 2018-07-13 PROCEDURE — 3331090002 HH PPS REVENUE DEBIT

## 2018-07-13 NOTE — PROGRESS NOTES
Patient has graduated from the Transitions of Care Coordination  program on 7/13/18. Patient's symptoms are stable at this time. Patient/family has the ability to self-manage. Care management goals have been completed at this time. No further nurse navigator follow up scheduled. Goals Addressed     None          Pt has nurse navigator's contact information for any further questions, concerns, or needs.   Patients upcoming visits:  Future Appointments  Date Time Provider Ana Joshi   7/18/2018 To Be Determined 1 [facility] Will Vega   7/18/2018 3:00 PM 1711 [facility] [address]   7/25/2018 To Be Determined Jose Alberto Saha LPN [facility] [address]   8/1/2018 To Be Determined Jose Alberto Saha LPN [facility]   8/8/2018 To Be Determined [address] [place] [address] Will Vega   8/8/2018 To Be Determined Allison Ayala [facility] [address]

## 2018-07-14 PROCEDURE — 3331090001 HH PPS REVENUE CREDIT

## 2018-07-14 PROCEDURE — 3331090002 HH PPS REVENUE DEBIT

## 2018-07-15 PROCEDURE — 3331090001 HH PPS REVENUE CREDIT

## 2018-07-15 PROCEDURE — 3331090002 HH PPS REVENUE DEBIT

## 2018-07-16 PROCEDURE — 3331090001 HH PPS REVENUE CREDIT

## 2018-07-16 PROCEDURE — 3331090002 HH PPS REVENUE DEBIT

## 2018-07-17 PROCEDURE — 3331090002 HH PPS REVENUE DEBIT

## 2018-07-17 PROCEDURE — 3331090001 HH PPS REVENUE CREDIT

## 2018-07-18 ENCOUNTER — HOME CARE VISIT (OUTPATIENT)
Dept: SCHEDULING | Facility: HOME HEALTH | Age: 83
End: 2018-07-18
Payer: MEDICARE

## 2018-07-18 PROCEDURE — 3331090001 HH PPS REVENUE CREDIT

## 2018-07-18 PROCEDURE — G0299 HHS/HOSPICE OF RN EA 15 MIN: HCPCS

## 2018-07-18 PROCEDURE — 3331090002 HH PPS REVENUE DEBIT

## 2018-07-19 VITALS
SYSTOLIC BLOOD PRESSURE: 116 MMHG | DIASTOLIC BLOOD PRESSURE: 70 MMHG | TEMPERATURE: 98 F | OXYGEN SATURATION: 98 % | RESPIRATION RATE: 18 BRPM | HEART RATE: 78 BPM

## 2018-07-19 PROCEDURE — 3331090002 HH PPS REVENUE DEBIT

## 2018-07-19 PROCEDURE — 3331090001 HH PPS REVENUE CREDIT

## 2018-07-20 PROCEDURE — 3331090001 HH PPS REVENUE CREDIT

## 2018-07-20 PROCEDURE — 3331090002 HH PPS REVENUE DEBIT

## 2018-07-21 PROCEDURE — 3331090002 HH PPS REVENUE DEBIT

## 2018-07-21 PROCEDURE — 3331090001 HH PPS REVENUE CREDIT

## 2018-07-22 PROCEDURE — 3331090001 HH PPS REVENUE CREDIT

## 2018-07-22 PROCEDURE — 3331090002 HH PPS REVENUE DEBIT

## 2018-07-23 ENCOUNTER — HOSPITAL ENCOUNTER (EMERGENCY)
Age: 83
Discharge: HOME | End: 2018-07-24
Attending: EMERGENCY MEDICINE
Payer: MEDICARE

## 2018-07-23 VITALS
SYSTOLIC BLOOD PRESSURE: 116 MMHG | BODY MASS INDEX: 26.95 KG/M2 | RESPIRATION RATE: 17 BRPM | TEMPERATURE: 101.9 F | WEIGHT: 157 LBS | HEART RATE: 77 BPM | OXYGEN SATURATION: 93 % | DIASTOLIC BLOOD PRESSURE: 59 MMHG

## 2018-07-23 DIAGNOSIS — N30.00 ACUTE CYSTITIS WITHOUT HEMATURIA: Primary | ICD-10-CM

## 2018-07-23 LAB
ALBUMIN SERPL-MCNC: 3.3 G/DL (ref 3.5–5)
ALBUMIN/GLOB SERPL: 0.8 {RATIO} (ref 1.1–2.2)
ALP SERPL-CCNC: 64 U/L (ref 45–117)
ALT SERPL-CCNC: 12 U/L (ref 12–78)
ANION GAP SERPL CALC-SCNC: 7 MMOL/L (ref 5–15)
APPEARANCE UR: ABNORMAL
AST SERPL-CCNC: 12 U/L (ref 15–37)
BACTERIA URNS QL MICRO: ABNORMAL /HPF
BASOPHILS # BLD: 0 K/UL (ref 0–0.1)
BASOPHILS NFR BLD: 0 % (ref 0–1)
BILIRUB SERPL-MCNC: 0.5 MG/DL (ref 0.2–1)
BILIRUB UR QL: NEGATIVE
BUN SERPL-MCNC: 12 MG/DL (ref 6–20)
BUN/CREAT SERPL: 11 (ref 12–20)
CALCIUM SERPL-MCNC: 8.9 MG/DL (ref 8.5–10.1)
CHLORIDE SERPL-SCNC: 106 MMOL/L (ref 97–108)
CO2 SERPL-SCNC: 25 MMOL/L (ref 21–32)
COLOR UR: ABNORMAL
CREAT SERPL-MCNC: 1.08 MG/DL (ref 0.7–1.3)
DIFFERENTIAL METHOD BLD: ABNORMAL
EOSINOPHIL # BLD: 0 K/UL (ref 0–0.4)
EOSINOPHIL NFR BLD: 0 % (ref 0–7)
EPITH CASTS URNS QL MICRO: ABNORMAL /LPF
ERYTHROCYTE [DISTWIDTH] IN BLOOD BY AUTOMATED COUNT: 12.7 % (ref 11.5–14.5)
GLOBULIN SER CALC-MCNC: 4.3 G/DL (ref 2–4)
GLUCOSE SERPL-MCNC: 162 MG/DL (ref 65–100)
GLUCOSE UR STRIP.AUTO-MCNC: NEGATIVE MG/DL
HCT VFR BLD AUTO: 38.6 % (ref 36.6–50.3)
HGB BLD-MCNC: 12.8 G/DL (ref 12.1–17)
HGB UR QL STRIP: ABNORMAL
IMM GRANULOCYTES # BLD: 0 K/UL (ref 0–0.04)
IMM GRANULOCYTES NFR BLD AUTO: 0 % (ref 0–0.5)
KETONES UR QL STRIP.AUTO: 15 MG/DL
LACTATE SERPL-SCNC: 1.4 MMOL/L (ref 0.4–2)
LEUKOCYTE ESTERASE UR QL STRIP.AUTO: ABNORMAL
LYMPHOCYTES # BLD: 0.6 K/UL (ref 0.8–3.5)
LYMPHOCYTES NFR BLD: 4 % (ref 12–49)
MCH RBC QN AUTO: 30.1 PG (ref 26–34)
MCHC RBC AUTO-ENTMCNC: 33.2 G/DL (ref 30–36.5)
MCV RBC AUTO: 90.8 FL (ref 80–99)
MONOCYTES # BLD: 0.9 K/UL (ref 0–1)
MONOCYTES NFR BLD: 6 % (ref 5–13)
NEUTS SEG # BLD: 13.8 K/UL (ref 1.8–8)
NEUTS SEG NFR BLD: 90 % (ref 32–75)
NITRITE UR QL STRIP.AUTO: POSITIVE
NRBC # BLD: 0 K/UL (ref 0–0.01)
NRBC BLD-RTO: 0 PER 100 WBC
OTHER,OTHU: ABNORMAL
PH UR STRIP: 5 [PH] (ref 5–8)
PLATELET # BLD AUTO: 166 K/UL (ref 150–400)
PMV BLD AUTO: 9.7 FL (ref 8.9–12.9)
POTASSIUM SERPL-SCNC: 4.1 MMOL/L (ref 3.5–5.1)
PROT SERPL-MCNC: 7.6 G/DL (ref 6.4–8.2)
PROT UR STRIP-MCNC: ABNORMAL MG/DL
RBC # BLD AUTO: 4.25 M/UL (ref 4.1–5.7)
RBC #/AREA URNS HPF: ABNORMAL /HPF (ref 0–5)
RBC MORPH BLD: ABNORMAL
SODIUM SERPL-SCNC: 138 MMOL/L (ref 136–145)
SP GR UR REFRACTOMETRY: 1.02 (ref 1–1.03)
UROBILINOGEN UR QL STRIP.AUTO: 1 EU/DL (ref 0.2–1)
WBC # BLD AUTO: 15.3 K/UL (ref 4.1–11.1)
WBC URNS QL MICRO: >100 /HPF (ref 0–4)

## 2018-07-23 PROCEDURE — 87040 BLOOD CULTURE FOR BACTERIA: CPT | Performed by: EMERGENCY MEDICINE

## 2018-07-23 PROCEDURE — 3331090002 HH PPS REVENUE DEBIT

## 2018-07-23 PROCEDURE — 99285 EMERGENCY DEPT VISIT HI MDM: CPT

## 2018-07-23 PROCEDURE — 96365 THER/PROPH/DIAG IV INF INIT: CPT

## 2018-07-23 PROCEDURE — 81001 URINALYSIS AUTO W/SCOPE: CPT | Performed by: EMERGENCY MEDICINE

## 2018-07-23 PROCEDURE — 74011000258 HC RX REV CODE- 258: Performed by: EMERGENCY MEDICINE

## 2018-07-23 PROCEDURE — 36415 COLL VENOUS BLD VENIPUNCTURE: CPT | Performed by: EMERGENCY MEDICINE

## 2018-07-23 PROCEDURE — 93005 ELECTROCARDIOGRAM TRACING: CPT

## 2018-07-23 PROCEDURE — 85025 COMPLETE CBC W/AUTO DIFF WBC: CPT | Performed by: EMERGENCY MEDICINE

## 2018-07-23 PROCEDURE — 83605 ASSAY OF LACTIC ACID: CPT | Performed by: EMERGENCY MEDICINE

## 2018-07-23 PROCEDURE — 3331090001 HH PPS REVENUE CREDIT

## 2018-07-23 PROCEDURE — 80053 COMPREHEN METABOLIC PANEL: CPT | Performed by: EMERGENCY MEDICINE

## 2018-07-23 PROCEDURE — 74011250636 HC RX REV CODE- 250/636: Performed by: EMERGENCY MEDICINE

## 2018-07-23 RX ORDER — CEPHALEXIN 500 MG/1
500 CAPSULE ORAL 3 TIMES DAILY
Qty: 21 CAP | Refills: 0 | Status: SHIPPED | OUTPATIENT
Start: 2018-07-23 | End: 2018-07-30

## 2018-07-23 RX ADMIN — SODIUM CHLORIDE 500 ML: 900 INJECTION, SOLUTION INTRAVENOUS at 20:53

## 2018-07-23 RX ADMIN — CEFTRIAXONE 1 G: 1 INJECTION, POWDER, FOR SOLUTION INTRAMUSCULAR; INTRAVENOUS at 20:53

## 2018-07-23 NOTE — ED NOTES
Bedside and Verbal shift change report given to Marjorie Newton RN (oncoming nurse) by Natasha Nixon RN (offgoing nurse). Report included the following information SBAR, Kardex, ED Summary, [facility] and Recent Results.

## 2018-07-23 NOTE — ED PROVIDER NOTES
EMERGENCY DEPARTMENT HISTORY AND PHYSICAL EXAM 
 
 
Date: 7/23/2018 Patient Name: Bryce Monique History of Presenting Illness Chief Complaint Patient presents with  Altered mental status Per EMS, pt arrives from home. Per EMS, family called d/t pt being more lethargic than baseline. Patient only oriented to self at present. Per EMS, pt usually ambulates at baseline and was unable to ambulate today History Provided By: Patient's daughter HPI: Bryce Monique, 80 y.o. male with PMHx significant for dementia, HTN, CAD, CHF presents via EMS with daughter to the ED with cc of altered mental status starting today. Patient's daughter reports she tried getting patient out of bed today and was able to get patient to out of bed or ambulate (normally able to ambulate with assistance) and was not responding to questions. Daughter also reports patient has had malodorous urine for the past few days. Daughter denies NV, cough, or SOB. There are no other complaints, changes, or physical findings at this time. PCP: Jem Alvarado MD 
 
Current Outpatient Prescriptions Medication Sig Dispense Refill  cephALEXin (KEFLEX) 500 mg capsule Take 1 Cap by mouth three (3) times daily for 7 days. 21 Cap 0  
 trimethoprim-sulfamethoxazole (BACTRIM DS, SEPTRA DS) 160-800 mg per tablet Take 1 Tab by mouth two (2) times a day.  senna (SENNA) 8.6 mg tablet Take 1 Tab by mouth two (2) times a day. take 1 capsule by mouth twice a day as needed for constipation  amLODIPine (NORVASC) 5 mg tablet Take 1 Tab by mouth daily. 30 Tab 0  
 RAPAFLO 4 mg capsule Take 4 mg by mouth daily (with breakfast). 11  
 miconazole (MICOTIN) 2 % topical cream Apply  to affected area two (2) times a day. (Patient taking differently: Apply 2 % to affected area two (2) times a day. apply thin amount to affected area two times daily  
associated Diagnosis: Tinea Cruris) 42.5 g 1  cholecalciferol, vitamin D3, (VITAMIN D3) 2,000 unit tab Take 400 Units by mouth daily.  aspirin 81 mg chewable tablet Take 81 mg by mouth daily. Past History Past Medical History: 
Past Medical History:  
Diagnosis Date  CAD (coronary artery disease)  Chronic systolic congestive heart failure ([facility] [address] 8/23/2016  Dementia  Elevated PSA 2/1/2016  H/O prostate cancer 2/1/2016  Hypertension  MI (myocardial infarction) ([facility] [address]  Pacemaker 8/23/2016  Pneumonia Past Surgical History: 
Past Surgical History:  
Procedure Laterality Date 1912 [facility] Family History: 
History reviewed. No pertinent family history. Social History: 
Social History Substance Use Topics  Smoking status: Never Smoker  Smokeless tobacco: Never Used  Alcohol use No  
 
 
Allergies: Allergies Allergen Reactions  Lisinopril Anaphylaxis Review of Systems Review of Systems Constitutional: Negative. Negative for appetite change, chills, fatigue and fever. HENT: Negative. Negative for congestion, rhinorrhea, sinus pressure and sore throat. Eyes: Negative. Respiratory: Negative. Negative for cough, choking, chest tightness, shortness of breath and wheezing. Cardiovascular: Negative. Negative for chest pain, palpitations and leg swelling. Gastrointestinal: Negative for abdominal pain, constipation, diarrhea, nausea and vomiting. Endocrine: Negative. Genitourinary: Negative. Negative for difficulty urinating, dysuria, flank pain and urgency. +malodorous urine Musculoskeletal: Negative. Skin: Negative. Neurological: Positive for speech difficulty. Negative for dizziness, weakness, light-headedness, numbness and headaches. Psychiatric/Behavioral: Negative. All other systems reviewed and are negative. Physical Exam  
Physical Exam  
Constitutional: He appears well-developed and well-nourished. No distress.   
Elderly male, appears chronically ill HENT:  
Head: Normocephalic and atraumatic. Mouth/Throat: Oropharynx is clear and moist. No oropharyngeal exudate. Eyes: Conjunctivae and EOM are normal. Pupils are equal, round, and reactive to light. Neck: Normal range of motion. Neck supple. No JVD present. No tracheal deviation present. Cardiovascular: Normal rate, regular rhythm, normal heart sounds and intact distal pulses. No murmur heard. Pulmonary/Chest: Effort normal and breath sounds normal. No stridor. No respiratory distress. He has no wheezes. He has no rales. He exhibits no tenderness. Pacemaker Abdominal: Soft. He exhibits no distension. There is no tenderness. There is no rebound and no guarding. Musculoskeletal: Normal range of motion. He exhibits no edema or tenderness. Neurological: He is alert. No cranial nerve deficit. Awake, alert, confused, No gross motor or sensory deficits Skin: Skin is warm and dry. He is not diaphoretic. Psychiatric: His behavior is normal.  
Angry affect Nursing note and vitals reviewed. Diagnostic Study Results Labs - Recent Results (from the past 12 hour(s)) EKG, 12 LEAD, INITIAL Collection Time: 07/23/18  6:32 PM  
Result Value Ref Range Ventricular Rate 98 BPM  
 Atrial Rate 98 BPM  
 P-R Interval 160 ms QRS Duration 156 ms  
 Q-T Interval 410 ms QTC Calculation (Bezet) 523 ms Calculated P Axis 42 degrees Calculated R Axis -68 degrees Calculated T Axis 88 degrees Diagnosis Atrial-sensed ventricular-paced rhythm When compared with ECG of 06-JUN-2018 17:29, 
Vent. rate has increased BY  25 BPM 
  
CBC WITH AUTOMATED DIFF Collection Time: 07/23/18  6:57 PM  
Result Value Ref Range WBC 15.3 (H) 4.1 - 11.1 K/uL  
 RBC 4.25 4. 10 - 5.70 M/uL  
 HGB 12.8 12.1 - 17.0 g/dL HCT 38.6 36.6 - 50.3 % MCV 90.8 80.0 - 99.0 FL  
 MCH 30.1 26.0 - 34.0 PG  
 MCHC 33.2 30.0 - 36.5 g/dL  
 RDW 12.7 11.5 - 14.5 % PLATELET 477 331 - 845 K/uL  MPV 9.7 8.9 - 12.9 FL  
 NRBC 0.0 0  WBC ABSOLUTE NRBC 0.00 0.00 - 0.01 K/uL NEUTROPHILS 90 (H) 32 - 75 % LYMPHOCYTES 4 (L) 12 - 49 % MONOCYTES 6 5 - 13 % EOSINOPHILS 0 0 - 7 % BASOPHILS 0 0 - 1 % IMMATURE GRANULOCYTES 0 0.0 - 0.5 % ABS. NEUTROPHILS 13.8 (H) 1.8 - 8.0 K/UL  
 ABS. LYMPHOCYTES 0.6 (L) 0.8 - 3.5 K/UL  
 ABS. MONOCYTES 0.9 0.0 - 1.0 K/UL  
 ABS. EOSINOPHILS 0.0 0.0 - 0.4 K/UL  
 ABS. BASOPHILS 0.0 0.0 - 0.1 K/UL  
 ABS. IMM. GRANS. 0.0 0.00 - 0.04 K/UL  
 DF SMEAR SCANNED    
 RBC COMMENTS NORMOCYTIC, NORMOCHROMIC METABOLIC PANEL, COMPREHENSIVE Collection Time: 07/23/18  6:57 PM  
Result Value Ref Range Sodium 138 136 - 145 mmol/L Potassium 4.1 3.5 - 5.1 mmol/L Chloride 106 97 - 108 mmol/L  
 CO2 25 21 - 32 mmol/L Anion gap 7 5 - 15 mmol/L Glucose 162 (H) 65 - 100 mg/dL BUN 12 6 - 20 MG/DL Creatinine 1.08 0.70 - 1.30 MG/DL  
 BUN/Creatinine ratio 11 (L) 12 - 20 GFR est AA >60 >60 ml/min/1.73m2 GFR est non-AA >60 >60 ml/min/1.73m2 Calcium 8.9 8.5 - 10.1 MG/DL Bilirubin, total 0.5 0.2 - 1.0 MG/DL  
 ALT (SGPT) 12 12 - 78 U/L  
 AST (SGOT) 12 (L) 15 - 37 U/L Alk. phosphatase 64 45 - 117 U/L Protein, total 7.6 6.4 - 8.2 g/dL Albumin 3.3 (L) 3.5 - 5.0 g/dL Globulin 4.3 (H) 2.0 - 4.0 g/dL A-G Ratio 0.8 (L) 1.1 - 2.2 LACTIC ACID Collection Time: 07/23/18  6:57 PM  
Result Value Ref Range Lactic acid 1.4 0.4 - 2.0 MMOL/L  
URINALYSIS W/ RFLX MICROSCOPIC Collection Time: 07/23/18  7:06 PM  
Result Value Ref Range Color YELLOW/STRAW Appearance CLOUDY (A) CLEAR Specific gravity 1.018 1.003 - 1.030    
 pH (UA) 5.0 5.0 - 8.0 Protein TRACE (A) NEG mg/dL Glucose NEGATIVE  NEG mg/dL Ketone 15 (A) NEG mg/dL Bilirubin NEGATIVE  NEG Blood SMALL (A) NEG Urobilinogen 1.0 0.2 - 1.0 EU/dL Nitrites POSITIVE (A) NEG  Leukocyte Esterase MODERATE (A) NEG    
 WBC >100 (H) 0 - 4 /hpf  
 RBC 10-20 0 - 5 /hpf Epithelial cells FEW FEW /lpf Bacteria 4+ (A) NEG /hpf Other: Renal Epithelial cells Present Radiologic Studies - No orders to display CT Results  (Last 48 hours) None CXR Results  (Last 48 hours) None Medical Decision Making I am the first provider for this patient. I reviewed the vital signs, available nursing notes, past medical history, past surgical history, family history and social history. Vital Signs-Reviewed the patient's vital signs. Patient Vitals for the past 12 hrs: 
 Temp Pulse Resp BP SpO2  
07/23/18 1900 - 89 22 141/63 97 %  
07/23/18 1831 (!) 101.9 °F (38.8 °C) 96 26 119/61 95 % Pulse Oximetry Analysis - 97% on RA Cardiac Monitor:  
Rate: 89 bpm 
Rhythm: Normal Sinus Rhythm EKG interpretation: (Preliminary) Atrial paced 98, Geoffrey Melchor,  
 
 
Records Reviewed: Nursing Notes and Old Medical Records Provider Notes (Medical Decision Making): DDx: UTI, metabolic encephalopathy, dehydration, pneumonia ED Course:  
Initial assessment performed. The patients presenting problems have been discussed, and they are in agreement with the care plan formulated and outlined with them. I have encouraged them to ask questions as they arise throughout their visit. 10:36 PM 
Patient was able to stand and walk with assistance 10:54 PM 
Discussed with family, patient at baseline Critical Care Time:  
 
 
Disposition: 
DISCHARGE NOTE: 
10:54 PM 
The patient is ready for discharge. The patients signs, symptoms, diagnosis, and instructions for discharge have been discussed and the pt has conveyed their understanding. The patient is to follow up as recommended or return to the ER should their symptoms worsen. Plan has been discussed and patient has conveyed their agreement. PLAN: Discharge home 1. Current Discharge Medication List  
  
START taking these medications  Details  
cephALEXin (KEFLEX) 500 mg capsule Take 1 Cap by mouth three (3) times daily for 7 days. Qty: 21 Cap, Refills: 0  
  
  
 
2. Follow-up Information Follow up With Details Comments Contact Info Phys Other, MD   Patient can only remember the practice name and not the physician Return to ED if worse Diagnosis Clinical Impression: 1. Acute cystitis without hematuria Attestations: This note is prepared by Kayla Dorantes, acting as Scribe for DO Burgess Yandy Herrera DO: The scribe's documentation has been prepared under my direction and personally reviewed by me in its entirety. I confirm that the note above accurately reflects all work, treatment, procedures, and medical decision making performed by me.

## 2018-07-23 NOTE — ED NOTES
Scratch markings to innter Rt thigh. Non blancheable redness to Rt heel and sacrum. Wounds present on arrival.    Repositioned pt onto Lt side, pillows placed for comfort.

## 2018-07-23 NOTE — ED NOTES
Bedside and Verbal shift change report given to Jessica Menon RN (oncoming nurse) by this RN (offgoing nurse). Report included the following information SBAR.

## 2018-07-23 NOTE — ED NOTES
Per EMS, pt arrives from home. Per EMS, family called d/t pt being more lethargic than baseline. Patient only oriented to self at present. Per EMS, pt usually ambulates at baseline and was unable to ambulate today. Per EMS, pt was tachycardic and had a fever of 101 for EMS.     Code purple called

## 2018-07-24 LAB
ATRIAL RATE: 98 BPM
CALCULATED P AXIS, ECG09: 42 DEGREES
CALCULATED R AXIS, ECG10: -68 DEGREES
CALCULATED T AXIS, ECG11: 88 DEGREES
DIAGNOSIS, 93000: NORMAL
P-R INTERVAL, ECG05: 160 MS
Q-T INTERVAL, ECG07: 410 MS
QRS DURATION, ECG06: 156 MS
QTC CALCULATION (BEZET), ECG08: 523 MS
VENTRICULAR RATE, ECG03: 98 BPM

## 2018-07-24 PROCEDURE — 3331090002 HH PPS REVENUE DEBIT

## 2018-07-24 PROCEDURE — 3331090001 HH PPS REVENUE CREDIT

## 2018-07-24 NOTE — DISCHARGE INSTRUCTIONS

## 2018-07-24 NOTE — ED NOTES
Pt discharged by MD Julissa Meza. Pt provided with discharge instructions Rx and instructions on follow up care. Pt out of ED in stable condition accompanied by [facility] via ambulance.

## 2018-07-25 PROCEDURE — 3331090001 HH PPS REVENUE CREDIT

## 2018-07-25 PROCEDURE — 3331090002 HH PPS REVENUE DEBIT

## 2018-07-26 ENCOUNTER — HOME CARE VISIT (OUTPATIENT)
Dept: SCHEDULING | Facility: HOME HEALTH | Age: 83
End: 2018-07-26
Payer: MEDICARE

## 2018-07-26 VITALS
OXYGEN SATURATION: 98 % | RESPIRATION RATE: 18 BRPM | DIASTOLIC BLOOD PRESSURE: 76 MMHG | HEART RATE: 74 BPM | TEMPERATURE: 98 F | SYSTOLIC BLOOD PRESSURE: 122 MMHG

## 2018-07-26 PROCEDURE — 3331090001 HH PPS REVENUE CREDIT

## 2018-07-26 PROCEDURE — 3331090002 HH PPS REVENUE DEBIT

## 2018-07-26 PROCEDURE — G0299 HHS/HOSPICE OF RN EA 15 MIN: HCPCS

## 2018-07-27 PROCEDURE — 3331090001 HH PPS REVENUE CREDIT

## 2018-07-27 PROCEDURE — 3331090002 HH PPS REVENUE DEBIT

## 2018-07-28 LAB
BACTERIA SPEC CULT: NORMAL
SERVICE CMNT-IMP: NORMAL

## 2018-07-28 PROCEDURE — 3331090002 HH PPS REVENUE DEBIT

## 2018-07-28 PROCEDURE — 3331090001 HH PPS REVENUE CREDIT

## 2018-07-29 PROCEDURE — 3331090001 HH PPS REVENUE CREDIT

## 2018-07-29 PROCEDURE — 3331090002 HH PPS REVENUE DEBIT

## 2018-07-30 PROCEDURE — 3331090002 HH PPS REVENUE DEBIT

## 2018-07-30 PROCEDURE — 3331090001 HH PPS REVENUE CREDIT

## 2018-07-31 PROCEDURE — 3331090001 HH PPS REVENUE CREDIT

## 2018-07-31 PROCEDURE — 3331090002 HH PPS REVENUE DEBIT

## 2018-08-01 ENCOUNTER — HOME CARE VISIT (OUTPATIENT)
Dept: SCHEDULING | Facility: HOME HEALTH | Age: 83
End: 2018-08-01
Payer: MEDICARE

## 2018-08-01 PROCEDURE — 3331090001 HH PPS REVENUE CREDIT

## 2018-08-01 PROCEDURE — 3331090002 HH PPS REVENUE DEBIT

## 2018-08-02 PROCEDURE — 3331090002 HH PPS REVENUE DEBIT

## 2018-08-02 PROCEDURE — 3331090001 HH PPS REVENUE CREDIT

## 2018-08-03 PROCEDURE — 3331090002 HH PPS REVENUE DEBIT

## 2018-08-03 PROCEDURE — 3331090001 HH PPS REVENUE CREDIT

## 2018-08-04 PROCEDURE — 3331090002 HH PPS REVENUE DEBIT

## 2018-08-04 PROCEDURE — 3331090001 HH PPS REVENUE CREDIT

## 2018-08-05 PROCEDURE — 3331090002 HH PPS REVENUE DEBIT

## 2018-08-05 PROCEDURE — 3331090001 HH PPS REVENUE CREDIT

## 2018-09-04 ENCOUNTER — HOSPICE ADMISSION (OUTPATIENT)
Dept: HOSPICE | Facility: HOSPICE | Age: 83
End: 2018-09-04
Payer: COMMERCIAL

## 2018-09-05 PROCEDURE — 3336500001 HSPC ELECTION

## 2018-09-05 PROCEDURE — 0655 HSPC INPATIENT RESPITE

## 2018-09-06 PROCEDURE — 0655 HSPC INPATIENT RESPITE

## 2018-09-07 PROCEDURE — 0655 HSPC INPATIENT RESPITE

## 2018-09-08 PROCEDURE — 0655 HSPC INPATIENT RESPITE

## 2018-09-09 PROCEDURE — 0655 HSPC INPATIENT RESPITE

## 2018-09-10 PROCEDURE — 3336590001 HSPC ROOM AND BOARD

## 2018-09-10 PROCEDURE — 0651 HSPC ROUTINE HOME CARE

## 2018-09-11 PROCEDURE — 0651 HSPC ROUTINE HOME CARE

## 2018-09-11 PROCEDURE — 3336590001 HSPC ROOM AND BOARD

## 2018-09-12 PROCEDURE — 0651 HSPC ROUTINE HOME CARE

## 2018-09-13 PROCEDURE — 0651 HSPC ROUTINE HOME CARE

## 2018-09-14 PROCEDURE — 0651 HSPC ROUTINE HOME CARE

## 2018-09-15 PROCEDURE — 0651 HSPC ROUTINE HOME CARE

## 2018-09-16 PROCEDURE — 0651 HSPC ROUTINE HOME CARE

## 2018-09-17 PROCEDURE — 0651 HSPC ROUTINE HOME CARE

## 2018-09-18 PROCEDURE — 0651 HSPC ROUTINE HOME CARE

## 2018-09-19 PROCEDURE — 0651 HSPC ROUTINE HOME CARE

## 2018-09-20 PROCEDURE — 0651 HSPC ROUTINE HOME CARE
